# Patient Record
Sex: FEMALE | Race: WHITE | NOT HISPANIC OR LATINO | Employment: OTHER | URBAN - METROPOLITAN AREA
[De-identification: names, ages, dates, MRNs, and addresses within clinical notes are randomized per-mention and may not be internally consistent; named-entity substitution may affect disease eponyms.]

---

## 2023-02-23 LAB
ANION GAP IN SER/PLAS: 14 MMOL/L (ref 10–20)
APPEARANCE, URINE: ABNORMAL
BACTERIA, URINE: ABNORMAL /HPF
BASOPHILS (10*3/UL) IN BLOOD BY AUTOMATED COUNT: 0.03 X10E9/L (ref 0–0.1)
BASOPHILS/100 LEUKOCYTES IN BLOOD BY AUTOMATED COUNT: 0.3 % (ref 0–2)
BILIRUBIN, URINE: NEGATIVE
BLOOD, URINE: NEGATIVE
CALCIUM (MG/DL) IN SER/PLAS: 8.7 MG/DL (ref 8.6–10.3)
CARBON DIOXIDE, TOTAL (MMOL/L) IN SER/PLAS: 23 MMOL/L (ref 21–32)
CHLORIDE (MMOL/L) IN SER/PLAS: 105 MMOL/L (ref 98–107)
COLOR, URINE: ABNORMAL
CREATININE (MG/DL) IN SER/PLAS: 0.68 MG/DL (ref 0.5–1.05)
EOSINOPHILS (10*3/UL) IN BLOOD BY AUTOMATED COUNT: 0.29 X10E9/L (ref 0–0.4)
EOSINOPHILS/100 LEUKOCYTES IN BLOOD BY AUTOMATED COUNT: 2.6 % (ref 0–6)
ERYTHROCYTE DISTRIBUTION WIDTH (RATIO) BY AUTOMATED COUNT: 15.8 % (ref 11.5–14.5)
ERYTHROCYTE MEAN CORPUSCULAR HEMOGLOBIN CONCENTRATION (G/DL) BY AUTOMATED: 31.2 G/DL (ref 32–36)
ERYTHROCYTE MEAN CORPUSCULAR VOLUME (FL) BY AUTOMATED COUNT: 90 FL (ref 80–100)
ERYTHROCYTES (10*6/UL) IN BLOOD BY AUTOMATED COUNT: 4.13 X10E12/L (ref 4–5.2)
GFR FEMALE: 88 ML/MIN/1.73M2
GLUCOSE (MG/DL) IN SER/PLAS: 156 MG/DL (ref 74–99)
GLUCOSE, URINE: NEGATIVE MG/DL
HEMATOCRIT (%) IN BLOOD BY AUTOMATED COUNT: 37.2 % (ref 36–46)
HEMOGLOBIN (G/DL) IN BLOOD: 11.6 G/DL (ref 12–16)
IMMATURE GRANULOCYTES/100 LEUKOCYTES IN BLOOD BY AUTOMATED COUNT: 1 % (ref 0–0.9)
KETONES, URINE: ABNORMAL MG/DL
LEUKOCYTE ESTERASE, URINE: ABNORMAL
LEUKOCYTES (10*3/UL) IN BLOOD BY AUTOMATED COUNT: 11 X10E9/L (ref 4.4–11.3)
LYMPHOCYTES (10*3/UL) IN BLOOD BY AUTOMATED COUNT: 1.72 X10E9/L (ref 0.8–3)
LYMPHOCYTES/100 LEUKOCYTES IN BLOOD BY AUTOMATED COUNT: 15.7 % (ref 13–44)
MONOCYTES (10*3/UL) IN BLOOD BY AUTOMATED COUNT: 1.29 X10E9/L (ref 0.05–0.8)
MONOCYTES/100 LEUKOCYTES IN BLOOD BY AUTOMATED COUNT: 11.8 % (ref 2–10)
MUCUS, URINE: ABNORMAL /LPF
NEUTROPHILS (10*3/UL) IN BLOOD BY AUTOMATED COUNT: 7.51 X10E9/L (ref 1.6–5.5)
NEUTROPHILS/100 LEUKOCYTES IN BLOOD BY AUTOMATED COUNT: 68.6 % (ref 40–80)
NITRITE, URINE: NEGATIVE
NRBC (PER 100 WBCS) BY AUTOMATED COUNT: 0 /100 WBC (ref 0–0)
PH, URINE: 6 (ref 5–8)
PLATELETS (10*3/UL) IN BLOOD AUTOMATED COUNT: 377 X10E9/L (ref 150–450)
POTASSIUM (MMOL/L) IN SER/PLAS: 3.9 MMOL/L (ref 3.5–5.3)
PROTEIN, URINE: ABNORMAL MG/DL
RBC, URINE: ABNORMAL /HPF (ref 0–5)
SODIUM (MMOL/L) IN SER/PLAS: 138 MMOL/L (ref 136–145)
SPECIFIC GRAVITY, URINE: 1.02 (ref 1–1.03)
SQUAMOUS EPITHELIAL CELLS, URINE: 4 /HPF
UREA NITROGEN (MG/DL) IN SER/PLAS: 15 MG/DL (ref 6–23)
UROBILINOGEN, URINE: <2 MG/DL (ref 0–1.9)
WBC, URINE: 18 /HPF (ref 0–5)

## 2023-03-29 LAB — AMMONIA (UMOL/L) IN PLASMA: 44 UMOL/L

## 2023-05-17 LAB — AMMONIA (UMOL/L) IN PLASMA: 28 UMOL/L

## 2023-05-18 LAB
ALANINE AMINOTRANSFERASE (SGPT) (U/L) IN SER/PLAS: 133 U/L (ref 7–45)
ALBUMIN (G/DL) IN SER/PLAS: 3 G/DL (ref 3.4–5)
ALKALINE PHOSPHATASE (U/L) IN SER/PLAS: 366 U/L (ref 33–136)
AMMONIA (UMOL/L) IN PLASMA: 35 UMOL/L
ANION GAP IN SER/PLAS: 12 MMOL/L (ref 10–20)
APPEARANCE, URINE: ABNORMAL
ASPARTATE AMINOTRANSFERASE (SGOT) (U/L) IN SER/PLAS: 64 U/L (ref 9–39)
BACTERIA, URINE: ABNORMAL /HPF
BASOPHILS (10*3/UL) IN BLOOD BY AUTOMATED COUNT: 0.05 X10E9/L (ref 0–0.1)
BASOPHILS/100 LEUKOCYTES IN BLOOD BY AUTOMATED COUNT: 0.6 % (ref 0–2)
BILIRUBIN TOTAL (MG/DL) IN SER/PLAS: 0.9 MG/DL (ref 0–1.2)
BILIRUBIN, URINE: NEGATIVE
BLOOD, URINE: NEGATIVE
CALCIUM (MG/DL) IN SER/PLAS: 8.7 MG/DL (ref 8.6–10.3)
CARBON DIOXIDE, TOTAL (MMOL/L) IN SER/PLAS: 23 MMOL/L (ref 21–32)
CHLORIDE (MMOL/L) IN SER/PLAS: 107 MMOL/L (ref 98–107)
COLOR, URINE: ABNORMAL
CREATININE (MG/DL) IN SER/PLAS: 0.61 MG/DL (ref 0.5–1.05)
EOSINOPHILS (10*3/UL) IN BLOOD BY AUTOMATED COUNT: 0.15 X10E9/L (ref 0–0.4)
EOSINOPHILS/100 LEUKOCYTES IN BLOOD BY AUTOMATED COUNT: 1.8 % (ref 0–6)
ERYTHROCYTE DISTRIBUTION WIDTH (RATIO) BY AUTOMATED COUNT: 14.6 % (ref 11.5–14.5)
ERYTHROCYTE MEAN CORPUSCULAR HEMOGLOBIN CONCENTRATION (G/DL) BY AUTOMATED: 31.8 G/DL (ref 32–36)
ERYTHROCYTE MEAN CORPUSCULAR VOLUME (FL) BY AUTOMATED COUNT: 89 FL (ref 80–100)
ERYTHROCYTES (10*6/UL) IN BLOOD BY AUTOMATED COUNT: 3.96 X10E12/L (ref 4–5.2)
GFR FEMALE: 90 ML/MIN/1.73M2
GLUCOSE (MG/DL) IN SER/PLAS: 96 MG/DL (ref 74–99)
GLUCOSE, URINE: NEGATIVE MG/DL
HEMATOCRIT (%) IN BLOOD BY AUTOMATED COUNT: 35.2 % (ref 36–46)
HEMOGLOBIN (G/DL) IN BLOOD: 11.2 G/DL (ref 12–16)
IMMATURE GRANULOCYTES/100 LEUKOCYTES IN BLOOD BY AUTOMATED COUNT: 0.5 % (ref 0–0.9)
KETONES, URINE: NEGATIVE MG/DL
LEUKOCYTE ESTERASE, URINE: ABNORMAL
LEUKOCYTES (10*3/UL) IN BLOOD BY AUTOMATED COUNT: 8.3 X10E9/L (ref 4.4–11.3)
LYMPHOCYTES (10*3/UL) IN BLOOD BY AUTOMATED COUNT: 2.02 X10E9/L (ref 0.8–3)
LYMPHOCYTES/100 LEUKOCYTES IN BLOOD BY AUTOMATED COUNT: 24.4 % (ref 13–44)
MONOCYTES (10*3/UL) IN BLOOD BY AUTOMATED COUNT: 1.08 X10E9/L (ref 0.05–0.8)
MONOCYTES/100 LEUKOCYTES IN BLOOD BY AUTOMATED COUNT: 13 % (ref 2–10)
MUCUS, URINE: ABNORMAL /LPF
NEUTROPHILS (10*3/UL) IN BLOOD BY AUTOMATED COUNT: 4.94 X10E9/L (ref 1.6–5.5)
NEUTROPHILS/100 LEUKOCYTES IN BLOOD BY AUTOMATED COUNT: 59.7 % (ref 40–80)
NITRITE, URINE: NEGATIVE
NRBC (PER 100 WBCS) BY AUTOMATED COUNT: 0 /100 WBC (ref 0–0)
PH, URINE: 5 (ref 5–8)
PLATELETS (10*3/UL) IN BLOOD AUTOMATED COUNT: 328 X10E9/L (ref 150–450)
POTASSIUM (MMOL/L) IN SER/PLAS: 4 MMOL/L (ref 3.5–5.3)
PROTEIN TOTAL: 5.5 G/DL (ref 6.4–8.2)
PROTEIN, URINE: NEGATIVE MG/DL
RBC, URINE: 3 /HPF (ref 0–5)
SODIUM (MMOL/L) IN SER/PLAS: 138 MMOL/L (ref 136–145)
SPECIFIC GRAVITY, URINE: 1.02 (ref 1–1.03)
SQUAMOUS EPITHELIAL CELLS, URINE: 6 /HPF
UREA NITROGEN (MG/DL) IN SER/PLAS: 12 MG/DL (ref 6–23)
UROBILINOGEN, URINE: 4 MG/DL (ref 0–1.9)
WBC CLUMPS, URINE: ABNORMAL /HPF
WBC, URINE: 18 /HPF (ref 0–5)

## 2023-05-31 LAB — AMMONIA (UMOL/L) IN PLASMA: 27 UMOL/L

## 2023-09-22 ENCOUNTER — NURSING HOME VISIT (OUTPATIENT)
Dept: POST ACUTE CARE | Facility: EXTERNAL LOCATION | Age: 83
End: 2023-09-22
Payer: MEDICARE

## 2023-09-22 DIAGNOSIS — C44.319 BASAL CELL CARCINOMA OF SKIN OF OTHER PARTS OF FACE: Primary | ICD-10-CM

## 2023-09-22 DIAGNOSIS — I10 ESSENTIAL HYPERTENSION: ICD-10-CM

## 2023-09-22 DIAGNOSIS — K21.9 GERD WITHOUT ESOPHAGITIS: ICD-10-CM

## 2023-09-22 DIAGNOSIS — F03.90 DEMENTIA, UNSPECIFIED DEMENTIA SEVERITY, UNSPECIFIED DEMENTIA TYPE, UNSPECIFIED WHETHER BEHAVIORAL, PSYCHOTIC, OR MOOD DISTURBANCE OR ANXIETY (MULTI): ICD-10-CM

## 2023-09-22 DIAGNOSIS — K58.9 IRRITABLE BOWEL SYNDROME, UNSPECIFIED TYPE: ICD-10-CM

## 2023-09-22 DIAGNOSIS — I48.20 CHRONIC ATRIAL FIBRILLATION (MULTI): ICD-10-CM

## 2023-09-22 DIAGNOSIS — E78.2 MIXED HYPERLIPIDEMIA: ICD-10-CM

## 2023-09-22 DIAGNOSIS — N32.81 OVERACTIVE BLADDER: ICD-10-CM

## 2023-09-22 PROCEDURE — 99305 1ST NF CARE MODERATE MDM 35: CPT | Performed by: INTERNAL MEDICINE

## 2023-09-22 NOTE — LETTER
Patient: Lizette Oneill  : 1940    Encounter Date: 2023    HISTORY & PHYSICAL    Subjective  Chief complaint: Lizette Oneill is a 83 y.o. female who is a long term resident patient being seen and evaluated for multiple medical problems.  Patient presents for weakness.    HPI:  Patient is a 83 year old female with a past medical history of HLD, HTN, dementia, dysphagia, cognitive communication deficit, and a-fib. Patient is a long term care resident who is now under ' care.      Past Medical History:   Diagnosis Date   • Contusion of right hip, initial encounter 2019    Contusion of right hip, initial encounter   • Personal history of other diseases of the digestive system     History of hemorrhoids   • Personal history of other diseases of the musculoskeletal system and connective tissue     History of backache   • Personal history of other diseases of the musculoskeletal system and connective tissue     History of arthritis   • Personal history of other diseases of the respiratory system     History of bronchitis   • Personal history of other diseases of the respiratory system 2016    History of pharyngitis   • Personal history of other infectious and parasitic diseases     History of measles   • Personal history of other specified conditions     History of fatigue   • Personal history of other specified conditions 2019    History of chest pain   • Personal history of other specified conditions 2019    History of dizziness   • Personal history of urinary (tract) infections     History of bladder infections   • Strain of muscle and tendon of unspecified wall of thorax, initial encounter 10/14/2016    Thoracic myofascial strain       Past Surgical History:   Procedure Laterality Date   • HYSTERECTOMY  2015    Hysterectomy       No family history on file.    Social History     Socioeconomic History   • Marital status:      Spouse name: Not on file   • Number of  children: Not on file   • Years of education: Not on file   • Highest education level: Not on file   Occupational History   • Not on file   Tobacco Use   • Smoking status: Not on file   • Smokeless tobacco: Not on file   Substance and Sexual Activity   • Alcohol use: Not on file   • Drug use: Not on file   • Sexual activity: Not on file   Other Topics Concern   • Not on file   Social History Narrative   • Not on file     Social Determinants of Health     Financial Resource Strain: Not on file   Food Insecurity: Not on file   Transportation Needs: Not on file   Physical Activity: Not on file   Stress: Not on file   Social Connections: Not on file   Intimate Partner Violence: Not on file   Housing Stability: Not on file       Vital signs: VSS    Objective  Physical Exam  HENT:      Head: Normocephalic and atraumatic.      Nose: Nose normal.      Mouth/Throat:      Mouth: Mucous membranes are moist.      Pharynx: Oropharynx is clear.   Eyes:      Extraocular Movements: Extraocular movements intact.      Pupils: Pupils are equal, round, and reactive to light.   Cardiovascular:      Rate and Rhythm: Normal rate and regular rhythm.   Pulmonary:      Effort: No respiratory distress.      Breath sounds: Normal breath sounds. No wheezing, rhonchi or rales.   Abdominal:      General: Bowel sounds are normal. There is no distension.      Palpations: Abdomen is soft.      Tenderness: There is no abdominal tenderness. There is no guarding.   Musculoskeletal:      Right lower leg: No edema.      Left lower leg: No edema.   Skin:     General: Skin is warm and dry.   Neurological:      Mental Status: She is alert. Mental status is at baseline.         Assessment/Plan  Problem List Items Addressed This Visit       Basal cell carcinoma of skin of other parts of face - Primary    Essential hypertension    GERD without esophagitis    Hyperlipidemia    IBS (irritable bowel syndrome)    Overactive bladder    Chronic atrial fibrillation  (CMS/[facility])    Dementia (CMS/[facility])     Medications, treatments, and labs reviewed  Continue medications and treatments as listed in [facility]    Scribe Attestation  I, Meena Armas   attest that this documentation has been prepared under the direction and in the presence of Oswaldo Meyers DO.    Provider Attestation - Scribe documentation  All medical record entries made by the Scribe were at my direction and personally dictated by me. I have reviewed the chart and agree that the record accurately reflects my personal performance of the history, physical exam, discussion and plan.    Oswaldo Meyers DO          Electronically Signed By: Oswaldo Meyers DO   10/6/23  9:36 PM

## 2023-10-04 NOTE — PROGRESS NOTES
HISTORY & PHYSICAL    Subjective   Chief complaint: Lizette Oneill is a 83 y.o. female who is a long term resident patient being seen and evaluated for multiple medical problems.  Patient presents for weakness.    HPI:  Patient is a 83 year old female with a past medical history of HLD, HTN, dementia, dysphagia, cognitive communication deficit, and a-fib. Patient is a long term care resident who is now under ' care.      Past Medical History:   Diagnosis Date    Contusion of right hip, initial encounter 08/01/2019    Contusion of right hip, initial encounter    Personal history of other diseases of the digestive system     History of hemorrhoids    Personal history of other diseases of the musculoskeletal system and connective tissue     History of backache    Personal history of other diseases of the musculoskeletal system and connective tissue     History of arthritis    Personal history of other diseases of the respiratory system     History of bronchitis    Personal history of other diseases of the respiratory system 02/08/2016    History of pharyngitis    Personal history of other infectious and parasitic diseases     History of measles    Personal history of other specified conditions     History of fatigue    Personal history of other specified conditions 05/17/2019    History of chest pain    Personal history of other specified conditions 05/17/2019    History of dizziness    Personal history of urinary (tract) infections     History of bladder infections    Strain of muscle and tendon of unspecified wall of thorax, initial encounter 10/14/2016    Thoracic myofascial strain       Past Surgical History:   Procedure Laterality Date    HYSTERECTOMY  11/18/2015    Hysterectomy       No family history on file.    Social History     Socioeconomic History    Marital status:      Spouse name: Not on file    Number of children: Not on file    Years of education: Not on file    Highest education level:  Not on file   Occupational History    Not on file   Tobacco Use    Smoking status: Not on file    Smokeless tobacco: Not on file   Substance and Sexual Activity    Alcohol use: Not on file    Drug use: Not on file    Sexual activity: Not on file   Other Topics Concern    Not on file   Social History Narrative    Not on file     Social Determinants of Health     Financial Resource Strain: Not on file   Food Insecurity: Not on file   Transportation Needs: Not on file   Physical Activity: Not on file   Stress: Not on file   Social Connections: Not on file   Intimate Partner Violence: Not on file   Housing Stability: Not on file       Vital signs: VSS    Objective   Physical Exam  HENT:      Head: Normocephalic and atraumatic.      Nose: Nose normal.      Mouth/Throat:      Mouth: Mucous membranes are moist.      Pharynx: Oropharynx is clear.   Eyes:      Extraocular Movements: Extraocular movements intact.      Pupils: Pupils are equal, round, and reactive to light.   Cardiovascular:      Rate and Rhythm: Normal rate and regular rhythm.   Pulmonary:      Effort: No respiratory distress.      Breath sounds: Normal breath sounds. No wheezing, rhonchi or rales.   Abdominal:      General: Bowel sounds are normal. There is no distension.      Palpations: Abdomen is soft.      Tenderness: There is no abdominal tenderness. There is no guarding.   Musculoskeletal:      Right lower leg: No edema.      Left lower leg: No edema.   Skin:     General: Skin is warm and dry.   Neurological:      Mental Status: She is alert. Mental status is at baseline.         Assessment/Plan   Problem List Items Addressed This Visit       Basal cell carcinoma of skin of other parts of face - Primary    Essential hypertension    GERD without esophagitis    Hyperlipidemia    IBS (irritable bowel syndrome)    Overactive bladder    Chronic atrial fibrillation (CMS/HCC)    Dementia (CMS/HCC)     Medications, treatments, and labs reviewed  Continue  medications and treatments as listed in [facility]    Scribe Attestation  I, Meena Armas   attest that this documentation has been prepared under the direction and in the presence of Oswaldo Meyers DO.    Provider Attestation - Scribe documentation  All medical record entries made by the Scribe were at my direction and personally dictated by me. I have reviewed the chart and agree that the record accurately reflects my personal performance of the history, physical exam, discussion and plan.    Oswaldo Meyers DO

## 2023-10-05 ENCOUNTER — NURSING HOME VISIT (OUTPATIENT)
Dept: POST ACUTE CARE | Facility: EXTERNAL LOCATION | Age: 83
End: 2023-10-05
Payer: MEDICARE

## 2023-10-05 DIAGNOSIS — I48.20 CHRONIC ATRIAL FIBRILLATION (MULTI): Primary | ICD-10-CM

## 2023-10-05 DIAGNOSIS — I10 ESSENTIAL HYPERTENSION: ICD-10-CM

## 2023-10-05 DIAGNOSIS — F03.90 DEMENTIA, UNSPECIFIED DEMENTIA SEVERITY, UNSPECIFIED DEMENTIA TYPE, UNSPECIFIED WHETHER BEHAVIORAL, PSYCHOTIC, OR MOOD DISTURBANCE OR ANXIETY (MULTI): ICD-10-CM

## 2023-10-05 DIAGNOSIS — K21.9 GERD WITHOUT ESOPHAGITIS: ICD-10-CM

## 2023-10-05 PROCEDURE — 99309 SBSQ NF CARE MODERATE MDM 30: CPT

## 2023-10-05 NOTE — LETTER
Patient: Lizette Oneill  : 1940    Encounter Date: 10/05/2023    PROGRESS NOTE    Subjective  Chief complaint: Lizette Oneill is a 83 y.o. female who is a long term care patient being seen and evaluated for  general medical care monthly follow-up    HPI:  Patient seen and evaluated for general medical care monthly follow-up.  Patient resides in SNF for assistance with ADLs and medical care.  PMH includes HTN, HLD, anemia, dementia, SVT, falls, A-fib, IBS, GERD.   Patient at baseline mentation, calm, cooperative, pleasant.  Offers no complaints at time.  A-fib stable- no SOB, fatigue, dizziness.  No recent falls.  No concerns per nursing          Objective  Vital signs: 129/74-73-18    Physical Exam  Constitutional:       Appearance: Normal appearance.   HENT:      Head: Normocephalic.      Mouth/Throat:      Mouth: Mucous membranes are moist.   Eyes:      Extraocular Movements: Extraocular movements intact.   Cardiovascular:      Rate and Rhythm: Normal rate. Rhythm irregular.      Pulses: Normal pulses.      Heart sounds: Normal heart sounds.   Pulmonary:      Effort: Pulmonary effort is normal.      Breath sounds: Normal breath sounds.   Abdominal:      General: Bowel sounds are normal.      Palpations: Abdomen is soft.   Musculoskeletal:         General: Normal range of motion.      Cervical back: Normal range of motion and neck supple.   Skin:     General: Skin is warm and dry.      Capillary Refill: Capillary refill takes less than 2 seconds.   Neurological:      Mental Status: She is alert. Mental status is at baseline.   Psychiatric:         Mood and Affect: Mood normal.         Behavior: Behavior normal.         Assessment/Plan  Problem List Items Addressed This Visit       Essential hypertension      Stable   Metoprolol   monitor         GERD without esophagitis      Stable   no epigastric pain or acidic taste in mouth   Omeprazole   monitor         Chronic atrial fibrillation (CMS/HCC) - Primary       Stable   rate controlled   Metoprolol   no SOB, fatigue, dizziness, palpitations   monitor         Dementia (CMS/[facility])      Stable   baseline mentation   no outbursts or agitation per    ensure safe environment   monitor          Medications, treatments, and labs reviewed  Continue medications and treatments as listed in EMR    YOANA Huddleston      Electronically Signed By: YOANA Huddleston   10/26/23  8:53 PM

## 2023-10-06 PROBLEM — E78.5 HYPERLIPIDEMIA: Status: ACTIVE | Noted: 2023-10-06

## 2023-10-06 PROBLEM — I48.20 CHRONIC ATRIAL FIBRILLATION (MULTI): Status: ACTIVE | Noted: 2023-10-06

## 2023-10-06 PROBLEM — N32.81 OVERACTIVE BLADDER: Status: ACTIVE | Noted: 2023-10-06

## 2023-10-06 PROBLEM — K21.9 GERD WITHOUT ESOPHAGITIS: Status: ACTIVE | Noted: 2023-10-06

## 2023-10-06 PROBLEM — I10 ESSENTIAL HYPERTENSION: Status: ACTIVE | Noted: 2023-10-06

## 2023-10-06 PROBLEM — F03.90 DEMENTIA (MULTI): Status: ACTIVE | Noted: 2023-10-06

## 2023-10-06 PROBLEM — C44.319 BASAL CELL CARCINOMA OF SKIN OF OTHER PARTS OF FACE: Status: ACTIVE | Noted: 2021-05-25

## 2023-10-06 PROBLEM — K58.9 IBS (IRRITABLE BOWEL SYNDROME): Status: ACTIVE | Noted: 2023-10-06

## 2023-10-27 NOTE — PROGRESS NOTES
PROGRESS NOTE    Subjective   Chief complaint: Lizette Oneill is a 83 y.o. female who is a long term care patient being seen and evaluated for  general medical care monthly follow-up    HPI:  Patient seen and evaluated for general medical care monthly follow-up.  Patient resides in SNF for assistance with ADLs and medical care.  PMH includes HTN, HLD, anemia, dementia, SVT, falls, A-fib, IBS, GERD.   Patient at baseline mentation, calm, cooperative, pleasant.  Offers no complaints at time.  A-fib stable- no SOB, fatigue, dizziness.  No recent falls.  No concerns per nursing          Objective   Vital signs: 129/74-73-18    Physical Exam  Constitutional:       Appearance: Normal appearance.   HENT:      Head: Normocephalic.      Mouth/Throat:      Mouth: Mucous membranes are moist.   Eyes:      Extraocular Movements: Extraocular movements intact.   Cardiovascular:      Rate and Rhythm: Normal rate. Rhythm irregular.      Pulses: Normal pulses.      Heart sounds: Normal heart sounds.   Pulmonary:      Effort: Pulmonary effort is normal.      Breath sounds: Normal breath sounds.   Abdominal:      General: Bowel sounds are normal.      Palpations: Abdomen is soft.   Musculoskeletal:         General: Normal range of motion.      Cervical back: Normal range of motion and neck supple.   Skin:     General: Skin is warm and dry.      Capillary Refill: Capillary refill takes less than 2 seconds.   Neurological:      Mental Status: She is alert. Mental status is at baseline.   Psychiatric:         Mood and Affect: Mood normal.         Behavior: Behavior normal.         Assessment/Plan   Problem List Items Addressed This Visit       Essential hypertension      Stable   Metoprolol   monitor         GERD without esophagitis      Stable   no epigastric pain or acidic taste in mouth   Omeprazole   monitor         Chronic atrial fibrillation (CMS/HCC) - Primary      Stable   rate controlled   Metoprolol   no SOB, fatigue, dizziness,  palpitations   monitor         Dementia (CMS/[facility])      Stable   baseline mentation   no outbursts or agitation per    ensure safe environment   monitor          Medications, treatments, and labs reviewed  Continue medications and treatments as listed in EMR    Jessi Felton, APRN-CNP

## 2023-11-13 ENCOUNTER — NURSING HOME VISIT (OUTPATIENT)
Dept: POST ACUTE CARE | Facility: EXTERNAL LOCATION | Age: 83
End: 2023-11-13
Payer: MEDICARE

## 2023-11-13 DIAGNOSIS — K21.9 GERD WITHOUT ESOPHAGITIS: ICD-10-CM

## 2023-11-13 DIAGNOSIS — F03.90 DEMENTIA, UNSPECIFIED DEMENTIA SEVERITY, UNSPECIFIED DEMENTIA TYPE, UNSPECIFIED WHETHER BEHAVIORAL, PSYCHOTIC, OR MOOD DISTURBANCE OR ANXIETY (MULTI): ICD-10-CM

## 2023-11-13 DIAGNOSIS — I10 ESSENTIAL HYPERTENSION: ICD-10-CM

## 2023-11-13 DIAGNOSIS — I48.20 CHRONIC ATRIAL FIBRILLATION (MULTI): Primary | ICD-10-CM

## 2023-11-13 PROCEDURE — 99309 SBSQ NF CARE MODERATE MDM 30: CPT

## 2023-11-13 NOTE — LETTER
Patient: Lizette Oneill  : 1940    Encounter Date: 2023    PROGRESS NOTE    Subjective  Chief complaint: Lizette Oneill is a 83 y.o. female who is a long term care patient being seen and evaluated for  general medical care and monthly follow-up    HPI:  Patient seen and evaluated for general medical care monthly follow-up.  Patient at baseline mentation, calm, cooperative.  Offers no complaints at time.  A-fib stable- no SOB, palpitations, dizziness.  BP within goal.  No concerns per nursing.          Objective  Vital signs:  128/80-97.8-76-18    Physical Exam  Constitutional:       Appearance: Normal appearance.   HENT:      Head: Normocephalic.      Mouth/Throat:      Mouth: Mucous membranes are moist.   Eyes:      Extraocular Movements: Extraocular movements intact.   Cardiovascular:      Rate and Rhythm: Normal rate. Rhythm irregular.      Pulses: Normal pulses.      Heart sounds: Normal heart sounds.   Pulmonary:      Effort: Pulmonary effort is normal.      Breath sounds: Normal breath sounds.   Abdominal:      General: Bowel sounds are normal.      Palpations: Abdomen is soft.   Musculoskeletal:         General: Normal range of motion.      Cervical back: Normal range of motion and neck supple.   Skin:     General: Skin is warm and dry.      Capillary Refill: Capillary refill takes less than 2 seconds.   Neurological:      Mental Status: She is alert. Mental status is at baseline.   Psychiatric:         Mood and Affect: Mood normal.         Behavior: Behavior normal.         Assessment/Plan  Problem List Items Addressed This Visit       Essential hypertension      Stable   Metoprolol   monitor         GERD without esophagitis      Stable   no epigastric pain or acidic taste in mouth   Omeprazole   monitor         Chronic atrial fibrillation (CMS/HCC) - Primary      Stable   rate controlled   Metoprolol   no SOB, fatigue, dizziness, palpitations   monitor         Dementia (CMS/HCC)      Stable    baseline mentation   no outbursts or agitation per    ensure safe environment   monitor          Medications, treatments, and labs reviewed  Continue medications and treatments as listed in EMR    YOANA Huddleston      Electronically Signed By: YOANA Huddleston   11/30/23 12:58 AM

## 2023-11-30 NOTE — PROGRESS NOTES
PROGRESS NOTE    Subjective   Chief complaint: Lizette Oneill is a 83 y.o. female who is a long term care patient being seen and evaluated for  general medical care and monthly follow-up    HPI:  Patient seen and evaluated for general medical care monthly follow-up.  Patient at baseline mentation, calm, cooperative.  Offers no complaints at time.  A-fib stable- no SOB, palpitations, dizziness.  BP within goal.  No concerns per nursing.          Objective   Vital signs:  128/80-97.8-76-18    Physical Exam  Constitutional:       Appearance: Normal appearance.   HENT:      Head: Normocephalic.      Mouth/Throat:      Mouth: Mucous membranes are moist.   Eyes:      Extraocular Movements: Extraocular movements intact.   Cardiovascular:      Rate and Rhythm: Normal rate. Rhythm irregular.      Pulses: Normal pulses.      Heart sounds: Normal heart sounds.   Pulmonary:      Effort: Pulmonary effort is normal.      Breath sounds: Normal breath sounds.   Abdominal:      General: Bowel sounds are normal.      Palpations: Abdomen is soft.   Musculoskeletal:         General: Normal range of motion.      Cervical back: Normal range of motion and neck supple.   Skin:     General: Skin is warm and dry.      Capillary Refill: Capillary refill takes less than 2 seconds.   Neurological:      Mental Status: She is alert. Mental status is at baseline.   Psychiatric:         Mood and Affect: Mood normal.         Behavior: Behavior normal.         Assessment/Plan   Problem List Items Addressed This Visit       Essential hypertension      Stable   Metoprolol   monitor         GERD without esophagitis      Stable   no epigastric pain or acidic taste in mouth   Omeprazole   monitor         Chronic atrial fibrillation (CMS/HCC) - Primary      Stable   rate controlled   Metoprolol   no SOB, fatigue, dizziness, palpitations   monitor         Dementia (CMS/HCC)      Stable   baseline mentation   no outbursts or agitation per    ensure safe  environment   monitor          Medications, treatments, and labs reviewed  Continue medications and treatments as listed in EMR    Jessi Felton, APRN-CNP

## 2023-11-30 NOTE — ASSESSMENT & PLAN NOTE
Stable   Metoprolol   monitor   Patient does not have any ACP documents/Medical Power of . LSW notes hospital will follow [place]'s Next of Kin hierarchy in the following descending order for priority:    Guardian  Spouse  [de-identified] of adult Children  Parents  [de-identified] of adult Siblings  Nearest Relative not described above    Per [place] Next of Kin hierarchy: Patients' 3 children will equally be [address]

## 2023-12-21 ENCOUNTER — NURSING HOME VISIT (OUTPATIENT)
Dept: POST ACUTE CARE | Facility: EXTERNAL LOCATION | Age: 83
End: 2023-12-21
Payer: MEDICARE

## 2023-12-21 DIAGNOSIS — I10 ESSENTIAL HYPERTENSION: ICD-10-CM

## 2023-12-21 DIAGNOSIS — I48.20 CHRONIC ATRIAL FIBRILLATION (MULTI): Primary | ICD-10-CM

## 2023-12-21 DIAGNOSIS — F03.90 DEMENTIA, UNSPECIFIED DEMENTIA SEVERITY, UNSPECIFIED DEMENTIA TYPE, UNSPECIFIED WHETHER BEHAVIORAL, PSYCHOTIC, OR MOOD DISTURBANCE OR ANXIETY (MULTI): ICD-10-CM

## 2023-12-21 DIAGNOSIS — K21.9 GERD WITHOUT ESOPHAGITIS: ICD-10-CM

## 2023-12-21 PROCEDURE — 99309 SBSQ NF CARE MODERATE MDM 30: CPT

## 2023-12-21 NOTE — LETTER
Patient: Lizette Oneill  : 1940    Encounter Date: 2023    PROGRESS NOTE    Subjective  Chief complaint: Lizette Oneill is a 83 y.o. female who is a long term care patient being seen and evaluated for  general medical care and monthly follow-up    HPI:  Patient seen and evaluated for general medical care monthly follow-up.  Patient at baseline mentation, calm, cooperative, pleasant.  Offers no complaints at time.  A-fib stable- no SOB, dizziness, palpitations.  Appetite stable.  Sleeping well.  Continues to work with therapy for safe mobility.  No concerns per nursing          Objective  Vital signs:  122/76-97.4-72-18-97%    Physical Exam  Constitutional:       Appearance: Normal appearance.   HENT:      Head: Normocephalic.      Mouth/Throat:      Mouth: Mucous membranes are moist.   Eyes:      Extraocular Movements: Extraocular movements intact.   Cardiovascular:      Rate and Rhythm: Normal rate. Rhythm irregular.      Pulses: Normal pulses.      Heart sounds: Normal heart sounds.   Pulmonary:      Effort: Pulmonary effort is normal.      Breath sounds: Normal breath sounds.   Abdominal:      General: Bowel sounds are normal.      Palpations: Abdomen is soft.   Musculoskeletal:         General: Normal range of motion.      Cervical back: Normal range of motion and neck supple.      Comments:  generalized weakness   Skin:     General: Skin is warm and dry.      Capillary Refill: Capillary refill takes less than 2 seconds.   Neurological:      Mental Status: She is alert. Mental status is at baseline.   Psychiatric:         Mood and Affect: Mood normal.         Behavior: Behavior normal.         Assessment/Plan  Problem List Items Addressed This Visit       Essential hypertension      Stable   Metoprolol   monitor         GERD without esophagitis      Stable   no epigastric pain or acidic taste in mouth   Omeprazole   monitor         Chronic atrial fibrillation (CMS/HCC) - Primary      Stable   rate  controlled   Metoprolol   no SOB, fatigue, dizziness, palpitations   monitor         Dementia (CMS/[facility])      Stable   baseline mentation   no outbursts or agitation per    ensure safe environment   monitor          Medications, treatments, and labs reviewed  Continue medications and treatments as listed in EMR    YOANA Huddleston      Electronically Signed By: YOANA Huddleston   12/31/23  5:16 PM

## 2023-12-31 NOTE — PROGRESS NOTES
PROGRESS NOTE    Subjective   Chief complaint: Lizette Oneill is a 83 y.o. female who is a long term care patient being seen and evaluated for  general medical care and monthly follow-up    HPI:  Patient seen and evaluated for general medical care monthly follow-up.  Patient at baseline mentation, calm, cooperative, pleasant.  Offers no complaints at time.  A-fib stable- no SOB, dizziness, palpitations.  Appetite stable.  Sleeping well.  Continues to work with therapy for safe mobility.  No concerns per nursing          Objective   Vital signs:  122/76-97.4-72-18-97%    Physical Exam  Constitutional:       Appearance: Normal appearance.   HENT:      Head: Normocephalic.      Mouth/Throat:      Mouth: Mucous membranes are moist.   Eyes:      Extraocular Movements: Extraocular movements intact.   Cardiovascular:      Rate and Rhythm: Normal rate. Rhythm irregular.      Pulses: Normal pulses.      Heart sounds: Normal heart sounds.   Pulmonary:      Effort: Pulmonary effort is normal.      Breath sounds: Normal breath sounds.   Abdominal:      General: Bowel sounds are normal.      Palpations: Abdomen is soft.   Musculoskeletal:         General: Normal range of motion.      Cervical back: Normal range of motion and neck supple.      Comments:  generalized weakness   Skin:     General: Skin is warm and dry.      Capillary Refill: Capillary refill takes less than 2 seconds.   Neurological:      Mental Status: She is alert. Mental status is at baseline.   Psychiatric:         Mood and Affect: Mood normal.         Behavior: Behavior normal.         Assessment/Plan   Problem List Items Addressed This Visit       Essential hypertension      Stable   Metoprolol   monitor         GERD without esophagitis      Stable   no epigastric pain or acidic taste in mouth   Omeprazole   monitor         Chronic atrial fibrillation (CMS/HCC) - Primary      Stable   rate controlled   Metoprolol   no SOB, fatigue, dizziness, palpitations    monitor         Dementia (CMS/[facility])      Stable   baseline mentation   no outbursts or agitation per    ensure safe environment   monitor          Medications, treatments, and labs reviewed  Continue medications and treatments as listed in EMR    Jessi Felton, APRN-CNP

## 2024-01-09 ENCOUNTER — NURSING HOME VISIT (OUTPATIENT)
Dept: POST ACUTE CARE | Facility: EXTERNAL LOCATION | Age: 84
End: 2024-01-09
Payer: MEDICARE

## 2024-01-09 DIAGNOSIS — I10 ESSENTIAL HYPERTENSION: ICD-10-CM

## 2024-01-09 DIAGNOSIS — K21.9 GERD WITHOUT ESOPHAGITIS: ICD-10-CM

## 2024-01-09 DIAGNOSIS — F03.90 DEMENTIA, UNSPECIFIED DEMENTIA SEVERITY, UNSPECIFIED DEMENTIA TYPE, UNSPECIFIED WHETHER BEHAVIORAL, PSYCHOTIC, OR MOOD DISTURBANCE OR ANXIETY (MULTI): Primary | ICD-10-CM

## 2024-01-09 PROCEDURE — 99309 SBSQ NF CARE MODERATE MDM 30: CPT

## 2024-01-09 NOTE — LETTER
Patient: Lizette Oneill  : 1940    Encounter Date: 2024    PROGRESS NOTE    Subjective  Chief complaint: Lizette Oneill is a 83 y.o. female who is a long term care patient being seen and evaluated for general medical care and monthly follow-up    HPI:  Patient seen and evaluated for general medical care and monthly follow-up.  Patient at baseline mentation, confused, pleasant.  Offers no complaints at time.  No outbursts or agitation noted per nursing.  Patient compliant with medications.  No changes in appetite or sleeping pattern.  Engages with other residents in activities and at meals.  No concerns per nursing          Objective  Vital signs:  132/88-98.1-87-20-98%    Physical Exam  Constitutional:       Appearance: Normal appearance.   HENT:      Head: Normocephalic.      Mouth/Throat:      Mouth: Mucous membranes are moist.   Eyes:      Extraocular Movements: Extraocular movements intact.   Cardiovascular:      Rate and Rhythm: Normal rate and regular rhythm.      Pulses: Normal pulses.      Heart sounds: Normal heart sounds.   Pulmonary:      Effort: Pulmonary effort is normal.      Breath sounds: Normal breath sounds.   Abdominal:      General: Bowel sounds are normal.      Palpations: Abdomen is soft.   Musculoskeletal:         General: Normal range of motion.      Cervical back: Normal range of motion and neck supple.      Comments:  generalized weakness   Skin:     General: Skin is warm and dry.      Capillary Refill: Capillary refill takes less than 2 seconds.   Neurological:      Mental Status: She is alert. Mental status is at baseline. She is disoriented.   Psychiatric:         Mood and Affect: Mood normal.         Behavior: Behavior normal.         Assessment/Plan  Problem List Items Addressed This Visit       Essential hypertension      Stable   Metoprolol   monitor         GERD without esophagitis      Stable   no epigastric pain or acidic taste in mouth   Omeprazole   monitor          Dementia (CMS/HCC) - Primary      Stable   baseline mentation   no outbursts or agitation per nursing   ensure safe environment   monitor          Medications, treatments, and labs reviewed  Continue medications and treatments as listed in EMR    YOANA Huddleston      Electronically Signed By: YOANA Huddleston   1/27/24  1:41 AM

## 2024-01-27 PROBLEM — F32.A DEPRESSION: Status: ACTIVE | Noted: 2024-01-27

## 2024-01-27 NOTE — ASSESSMENT & PLAN NOTE
Stable   baseline mentation   no outbursts or agitation per nursing   ensure safe environment   monitor

## 2024-01-27 NOTE — PROGRESS NOTES
PROGRESS NOTE    Subjective   Chief complaint: Lizette Oneill is a 83 y.o. female who is a long term care patient being seen and evaluated for general medical care and monthly follow-up    HPI:  Patient seen and evaluated for general medical care and monthly follow-up.  Patient at baseline mentation, confused, pleasant.  Offers no complaints at time.  No outbursts or agitation noted per nursing.  Patient compliant with medications.  No changes in appetite or sleeping pattern.  Engages with other residents in activities and at meals.  No concerns per nursing          Objective   Vital signs:  132/88-98.1-87-20-98%    Physical Exam  Constitutional:       Appearance: Normal appearance.   HENT:      Head: Normocephalic.      Mouth/Throat:      Mouth: Mucous membranes are moist.   Eyes:      Extraocular Movements: Extraocular movements intact.   Cardiovascular:      Rate and Rhythm: Normal rate and regular rhythm.      Pulses: Normal pulses.      Heart sounds: Normal heart sounds.   Pulmonary:      Effort: Pulmonary effort is normal.      Breath sounds: Normal breath sounds.   Abdominal:      General: Bowel sounds are normal.      Palpations: Abdomen is soft.   Musculoskeletal:         General: Normal range of motion.      Cervical back: Normal range of motion and neck supple.      Comments:  generalized weakness   Skin:     General: Skin is warm and dry.      Capillary Refill: Capillary refill takes less than 2 seconds.   Neurological:      Mental Status: She is alert. Mental status is at baseline. She is disoriented.   Psychiatric:         Mood and Affect: Mood normal.         Behavior: Behavior normal.         Assessment/Plan   Problem List Items Addressed This Visit       Essential hypertension      Stable   Metoprolol   monitor         GERD without esophagitis      Stable   no epigastric pain or acidic taste in mouth   Omeprazole   monitor         Dementia (CMS/HCC) - Primary      Stable   baseline mentation   no  outbursts or agitation per nursing   ensure safe environment   monitor          Medications, treatments, and labs reviewed  Continue medications and treatments as listed in EMR    Jessi Felton, APRN-CNP

## 2024-02-08 ENCOUNTER — NURSING HOME VISIT (OUTPATIENT)
Dept: POST ACUTE CARE | Facility: EXTERNAL LOCATION | Age: 84
End: 2024-02-08
Payer: MEDICARE

## 2024-02-08 DIAGNOSIS — I48.20 CHRONIC ATRIAL FIBRILLATION (MULTI): Primary | ICD-10-CM

## 2024-02-08 DIAGNOSIS — I10 ESSENTIAL HYPERTENSION: ICD-10-CM

## 2024-02-08 DIAGNOSIS — K58.9 IRRITABLE BOWEL SYNDROME, UNSPECIFIED TYPE: ICD-10-CM

## 2024-02-08 DIAGNOSIS — F03.90 DEMENTIA, UNSPECIFIED DEMENTIA SEVERITY, UNSPECIFIED DEMENTIA TYPE, UNSPECIFIED WHETHER BEHAVIORAL, PSYCHOTIC, OR MOOD DISTURBANCE OR ANXIETY (MULTI): ICD-10-CM

## 2024-02-08 DIAGNOSIS — K21.9 GERD WITHOUT ESOPHAGITIS: ICD-10-CM

## 2024-02-08 PROCEDURE — 99309 SBSQ NF CARE MODERATE MDM 30: CPT

## 2024-02-08 NOTE — LETTER
Patient: Lizette Oneill  : 1940    Encounter Date: 2024    PROGRESS NOTE    Subjective  Chief complaint: Lizette Oneill is a 83 y.o. female who is a long term care patient being seen and evaluated for  general medical care and monthly follow-up    HPI:  Patient seen and evaluated for general medical care monthly follow-up.  Patient at baseline mentation, calm, cooperative, pleasant.  Offers no complaints at time.  Continues to work with therapy for strengthening and mobility.  BP at goal.  A-fib stable- no SOB, dizziness, palpitations. Appetite good.  No complaints of abdominal pain, constipation, diarrhea.  No concerns per nursing          Objective  Vital signs:  132/88-98.1-87-20-98%    Physical Exam  Constitutional:       Appearance: Normal appearance.   HENT:      Head: Normocephalic.      Mouth/Throat:      Mouth: Mucous membranes are moist.   Eyes:      Extraocular Movements: Extraocular movements intact.   Cardiovascular:      Rate and Rhythm: Normal rate. Rhythm irregular.      Pulses: Normal pulses.      Heart sounds: Normal heart sounds.   Pulmonary:      Effort: Pulmonary effort is normal.      Breath sounds: Normal breath sounds.   Abdominal:      General: Bowel sounds are normal.      Palpations: Abdomen is soft.   Musculoskeletal:         General: Normal range of motion.      Cervical back: Normal range of motion and neck supple.      Comments:  generalized weakness   Skin:     General: Skin is warm and dry.      Capillary Refill: Capillary refill takes less than 2 seconds.   Neurological:      Mental Status: She is alert. Mental status is at baseline.   Psychiatric:         Mood and Affect: Mood normal.         Behavior: Behavior normal.         Assessment/Plan  Problem List Items Addressed This Visit       Essential hypertension      Stable   Metoprolol   monitor         GERD without esophagitis      Stable   no epigastric pain or acidic taste in mouth   Omeprazole   monitor         IBS  (irritable bowel syndrome)      Stable   No abdominal pain, N/V/D, constipation   monitor         Chronic atrial fibrillation (CMS/HCC) - Primary      Stable   rate controlled   Metoprolol   no SOB, fatigue, dizziness, palpitations   monitor         Dementia (CMS/HCC)      Stable   baseline mentation   no outbursts or agitation per nursing   ensure safe environment   monitor          Medications, treatments, and labs reviewed  Continue medications and treatments as listed in EMR    YOANA Huddleston      Electronically Signed By: YOANA Huddleston   2/22/24  4:49 PM

## 2024-02-22 NOTE — PROGRESS NOTES
PROGRESS NOTE    Subjective   Chief complaint: Lizette Oneill is a 83 y.o. female who is a long term care patient being seen and evaluated for  general medical care and monthly follow-up    HPI:  Patient seen and evaluated for general medical care monthly follow-up.  Patient at baseline mentation, calm, cooperative, pleasant.  Offers no complaints at time.  Continues to work with therapy for strengthening and mobility.  BP at goal.  A-fib stable- no SOB, dizziness, palpitations. Appetite good.  No complaints of abdominal pain, constipation, diarrhea.  No concerns per nursing          Objective   Vital signs:  132/88-98.1-87-20-98%    Physical Exam  Constitutional:       Appearance: Normal appearance.   HENT:      Head: Normocephalic.      Mouth/Throat:      Mouth: Mucous membranes are moist.   Eyes:      Extraocular Movements: Extraocular movements intact.   Cardiovascular:      Rate and Rhythm: Normal rate. Rhythm irregular.      Pulses: Normal pulses.      Heart sounds: Normal heart sounds.   Pulmonary:      Effort: Pulmonary effort is normal.      Breath sounds: Normal breath sounds.   Abdominal:      General: Bowel sounds are normal.      Palpations: Abdomen is soft.   Musculoskeletal:         General: Normal range of motion.      Cervical back: Normal range of motion and neck supple.      Comments:  generalized weakness   Skin:     General: Skin is warm and dry.      Capillary Refill: Capillary refill takes less than 2 seconds.   Neurological:      Mental Status: She is alert. Mental status is at baseline.   Psychiatric:         Mood and Affect: Mood normal.         Behavior: Behavior normal.         Assessment/Plan   Problem List Items Addressed This Visit       Essential hypertension      Stable   Metoprolol   monitor         GERD without esophagitis      Stable   no epigastric pain or acidic taste in mouth   Omeprazole   monitor         IBS (irritable bowel syndrome)      Stable   No abdominal pain, N/V/D,  constipation   monitor         Chronic atrial fibrillation (CMS/[facility]) - Primary      Stable   rate controlled   Metoprolol   no SOB, fatigue, dizziness, palpitations   monitor         Dementia (CMS/[facility])      Stable   baseline mentation   no outbursts or agitation per nursing   ensure safe environment   monitor          Medications, treatments, and labs reviewed  Continue medications and treatments as listed in EMR    Jessi Felton, APRN-CNP

## 2024-03-07 ENCOUNTER — NURSING HOME VISIT (OUTPATIENT)
Dept: POST ACUTE CARE | Facility: EXTERNAL LOCATION | Age: 84
End: 2024-03-07
Payer: MEDICARE

## 2024-03-07 DIAGNOSIS — F03.90 DEMENTIA, UNSPECIFIED DEMENTIA SEVERITY, UNSPECIFIED DEMENTIA TYPE, UNSPECIFIED WHETHER BEHAVIORAL, PSYCHOTIC, OR MOOD DISTURBANCE OR ANXIETY (MULTI): ICD-10-CM

## 2024-03-07 DIAGNOSIS — I10 ESSENTIAL HYPERTENSION: ICD-10-CM

## 2024-03-07 DIAGNOSIS — I48.20 CHRONIC ATRIAL FIBRILLATION (MULTI): Primary | ICD-10-CM

## 2024-03-07 PROCEDURE — 99309 SBSQ NF CARE MODERATE MDM 30: CPT

## 2024-03-07 NOTE — LETTER
Patient: Lizette Oneill  : 1940    Encounter Date: 2024    PROGRESS NOTE    Subjective  Chief complaint: Lizette Oneill is a 83 y.o. female who is a long term care patient being seen and evaluated for  general medical care and monthly follow-up    HPI:  Patient seen and evaluated for general medical care monthly follow-up.  Patient at baseline mentation, pleasant, cooperative.  Offers no complaints at time.  Patient has a mole on her neck.  History of skin cancer.  Pending Derm appointment. BP within goal.  Afib stable- no sob, dizziness, palpitations. No F/C/N/V/D SOB chest pain. No concerns per nursing.          Objective  Vital signs: 127/79-84-18-98%    Physical Exam  Constitutional:       Appearance: Normal appearance.   HENT:      Head: Normocephalic.      Mouth/Throat:      Mouth: Mucous membranes are moist.   Eyes:      Extraocular Movements: Extraocular movements intact.   Cardiovascular:      Rate and Rhythm: Normal rate. Rhythm irregular.      Pulses: Normal pulses.      Heart sounds: Normal heart sounds.   Pulmonary:      Effort: Pulmonary effort is normal.      Breath sounds: Normal breath sounds.   Abdominal:      General: Bowel sounds are normal.      Palpations: Abdomen is soft.   Musculoskeletal:         General: Normal range of motion.      Cervical back: Normal range of motion and neck supple.      Comments: General weakness   Skin:     General: Skin is warm and dry.      Capillary Refill: Capillary refill takes less than 2 seconds.      Comments: Mole on neck   Neurological:      Mental Status: She is alert. Mental status is at baseline.   Psychiatric:         Mood and Affect: Mood normal.         Behavior: Behavior normal.         Assessment/Plan  Problem List Items Addressed This Visit       Essential hypertension      Stable   Metoprolol   monitor         Chronic atrial fibrillation (CMS/HCC) - Primary      Stable   rate controlled   Metoprolol   no SOB, fatigue, dizziness,  palpitations   monitor         Dementia (CMS/[facility])      Stable   baseline mentation   no outbursts or agitation per nursing   ensure safe environment   monitor          Medications, treatments, and labs reviewed  Continue medications and treatments as listed in EMR    YOANA Huddleston      Electronically Signed By: YOANA Huddleston   3/26/24  5:13 PM

## 2024-03-26 NOTE — PROGRESS NOTES
PROGRESS NOTE    Subjective   Chief complaint: Lizette Oneill is a 83 y.o. female who is a long term care patient being seen and evaluated for  general medical care and monthly follow-up    HPI:  Patient seen and evaluated for general medical care monthly follow-up.  Patient at baseline mentation, pleasant, cooperative.  Offers no complaints at time.  Patient has a mole on her neck.  History of skin cancer.  Pending Derm appointment. BP within goal.  Afib stable- no sob, dizziness, palpitations. No F/C/N/V/D SOB chest pain. No concerns per nursing.          Objective   Vital signs: 127/79-84-18-98%    Physical Exam  Constitutional:       Appearance: Normal appearance.   HENT:      Head: Normocephalic.      Mouth/Throat:      Mouth: Mucous membranes are moist.   Eyes:      Extraocular Movements: Extraocular movements intact.   Cardiovascular:      Rate and Rhythm: Normal rate. Rhythm irregular.      Pulses: Normal pulses.      Heart sounds: Normal heart sounds.   Pulmonary:      Effort: Pulmonary effort is normal.      Breath sounds: Normal breath sounds.   Abdominal:      General: Bowel sounds are normal.      Palpations: Abdomen is soft.   Musculoskeletal:         General: Normal range of motion.      Cervical back: Normal range of motion and neck supple.      Comments: General weakness   Skin:     General: Skin is warm and dry.      Capillary Refill: Capillary refill takes less than 2 seconds.      Comments: Mole on neck   Neurological:      Mental Status: She is alert. Mental status is at baseline.   Psychiatric:         Mood and Affect: Mood normal.         Behavior: Behavior normal.         Assessment/Plan   Problem List Items Addressed This Visit       Essential hypertension      Stable   Metoprolol   monitor         Chronic atrial fibrillation (CMS/HCC) - Primary      Stable   rate controlled   Metoprolol   no SOB, fatigue, dizziness, palpitations   monitor         Dementia (CMS/HCC)      Stable   baseline  mentation   no outbursts or agitation per nursing   ensure safe environment   monitor          Medications, treatments, and labs reviewed  Continue medications and treatments as listed in EMR    Jessi Felton, APRN-CNP

## 2024-04-03 ENCOUNTER — NURSING HOME VISIT (OUTPATIENT)
Dept: POST ACUTE CARE | Facility: EXTERNAL LOCATION | Age: 84
End: 2024-04-03
Payer: MEDICARE

## 2024-04-03 DIAGNOSIS — F03.90 DEMENTIA, UNSPECIFIED DEMENTIA SEVERITY, UNSPECIFIED DEMENTIA TYPE, UNSPECIFIED WHETHER BEHAVIORAL, PSYCHOTIC, OR MOOD DISTURBANCE OR ANXIETY (MULTI): ICD-10-CM

## 2024-04-03 DIAGNOSIS — N32.81 OVERACTIVE BLADDER: ICD-10-CM

## 2024-04-03 DIAGNOSIS — I48.20 CHRONIC ATRIAL FIBRILLATION (MULTI): Primary | ICD-10-CM

## 2024-04-03 DIAGNOSIS — I10 ESSENTIAL HYPERTENSION: ICD-10-CM

## 2024-04-03 DIAGNOSIS — K21.9 GERD WITHOUT ESOPHAGITIS: ICD-10-CM

## 2024-04-03 PROCEDURE — 99309 SBSQ NF CARE MODERATE MDM 30: CPT

## 2024-04-03 NOTE — LETTER
Patient: Lizette Oneill  : 1940    Encounter Date: 2024    PROGRESS NOTE    Subjective  Chief complaint: Lizette Oneill is a 83 y.o. female who is a long term care patient being seen and evaluated for general medical care and monthly follow-up    HPI:  Patient seen and evaluated for general medical care and monthly follow-up.  Patient at baseline mentation, calm, cooperative, pleasant.  Offers no complaints at time.  Continues to work with therapy for strengthening and mobility.   Ambulated 40'-50' with 2 WW. BP at goal.  A-fib stable- no SOB, dizziness, palpitations.  .  Engages with other residents in activities and meals. Appetite good.  No complaints of abdominal pain, constipation, diarrhea.  No concerns per nursing          Objective  Vital signs:   132/70-98.1-82-20-97%    Physical Exam  Constitutional:       Appearance: Normal appearance.   HENT:      Head: Normocephalic.      Mouth/Throat:      Mouth: Mucous membranes are moist.   Eyes:      Extraocular Movements: Extraocular movements intact.   Cardiovascular:      Rate and Rhythm: Normal rate. Rhythm irregular.      Pulses: Normal pulses.      Heart sounds: Normal heart sounds.   Pulmonary:      Effort: Pulmonary effort is normal.      Breath sounds: Normal breath sounds.   Abdominal:      General: Bowel sounds are normal.      Palpations: Abdomen is soft.   Musculoskeletal:         General: Normal range of motion.      Cervical back: Normal range of motion and neck supple.      Comments: .  Generalized weakness   Skin:     General: Skin is warm and dry.      Capillary Refill: Capillary refill takes less than 2 seconds.   Neurological:      Mental Status: She is alert. Mental status is at baseline.   Psychiatric:         Mood and Affect: Mood normal.         Behavior: Behavior normal.         Assessment/Plan  Problem List Items Addressed This Visit       Essential hypertension      Stable   Metoprolol   monitor         GERD without esophagitis       Stable   no epigastric pain or acidic taste in mouth   Omeprazole   monitor         Overactive bladder      No urinary complaints   Monitor         Chronic atrial fibrillation (Multi) - Primary      Stable   rate controlled   Metoprolol   no SOB, fatigue, dizziness, palpitations   monitor         Dementia (Multi)      Stable   baseline mentation   no outbursts or agitation per nursing   ensure safe environment   monitor          Medications, treatments, and labs reviewed  Continue medications and treatments as listed in EMR    YOANA Huddleston      Electronically Signed By: YOANA Huddleston   4/30/24  3:23 PM

## 2024-04-30 NOTE — PROGRESS NOTES
PROGRESS NOTE    Subjective   Chief complaint: Lizette Oneill is a 83 y.o. female who is a long term care patient being seen and evaluated for general medical care and monthly follow-up    HPI:  Patient seen and evaluated for general medical care and monthly follow-up.  Patient at baseline mentation, calm, cooperative, pleasant.  Offers no complaints at time.  Continues to work with therapy for strengthening and mobility.   Ambulated 40'-50' with 2 WW. BP at goal.  A-fib stable- no SOB, dizziness, palpitations.  .  Engages with other residents in activities and meals. Appetite good.  No complaints of abdominal pain, constipation, diarrhea.  No concerns per nursing          Objective   Vital signs:   132/70-98.1-82-20-97%    Physical Exam  Constitutional:       Appearance: Normal appearance.   HENT:      Head: Normocephalic.      Mouth/Throat:      Mouth: Mucous membranes are moist.   Eyes:      Extraocular Movements: Extraocular movements intact.   Cardiovascular:      Rate and Rhythm: Normal rate. Rhythm irregular.      Pulses: Normal pulses.      Heart sounds: Normal heart sounds.   Pulmonary:      Effort: Pulmonary effort is normal.      Breath sounds: Normal breath sounds.   Abdominal:      General: Bowel sounds are normal.      Palpations: Abdomen is soft.   Musculoskeletal:         General: Normal range of motion.      Cervical back: Normal range of motion and neck supple.      Comments: .  Generalized weakness   Skin:     General: Skin is warm and dry.      Capillary Refill: Capillary refill takes less than 2 seconds.   Neurological:      Mental Status: She is alert. Mental status is at baseline.   Psychiatric:         Mood and Affect: Mood normal.         Behavior: Behavior normal.         Assessment/Plan   Problem List Items Addressed This Visit       Essential hypertension      Stable   Metoprolol   monitor         GERD without esophagitis      Stable   no epigastric pain or acidic taste in mouth    Omeprazole   monitor         Overactive bladder      No urinary complaints   Monitor         Chronic atrial fibrillation (Multi) - Primary      Stable   rate controlled   Metoprolol   no SOB, fatigue, dizziness, palpitations   monitor         Dementia (Multi)      Stable   baseline mentation   no outbursts or agitation per nursing   ensure safe environment   monitor          Medications, treatments, and labs reviewed  Continue medications and treatments as listed in EMR    Jessi Felton, APRN-CNP

## 2024-05-08 ENCOUNTER — NURSING HOME VISIT (OUTPATIENT)
Dept: POST ACUTE CARE | Facility: EXTERNAL LOCATION | Age: 84
End: 2024-05-08
Payer: MEDICARE

## 2024-05-08 DIAGNOSIS — K58.9 IRRITABLE BOWEL SYNDROME, UNSPECIFIED TYPE: ICD-10-CM

## 2024-05-08 DIAGNOSIS — F03.90 DEMENTIA, UNSPECIFIED DEMENTIA SEVERITY, UNSPECIFIED DEMENTIA TYPE, UNSPECIFIED WHETHER BEHAVIORAL, PSYCHOTIC, OR MOOD DISTURBANCE OR ANXIETY (MULTI): ICD-10-CM

## 2024-05-08 DIAGNOSIS — I48.20 CHRONIC ATRIAL FIBRILLATION (MULTI): Primary | ICD-10-CM

## 2024-05-08 DIAGNOSIS — I10 ESSENTIAL HYPERTENSION: ICD-10-CM

## 2024-05-08 DIAGNOSIS — N32.81 OVERACTIVE BLADDER: ICD-10-CM

## 2024-05-08 PROCEDURE — 99309 SBSQ NF CARE MODERATE MDM 30: CPT

## 2024-05-08 NOTE — LETTER
Patient: Lizette Oneill  : 1940    Encounter Date: 2024    PROGRESS NOTE    Subjective  Chief complaint: Lizette Oneill is a 83 y.o. female who is a long term care patient being seen and evaluated for general medical care and monthly follow-up    HPI:  Patient seen and evaluated for general medical care and monthly follow-up.  Patient at baseline mentation, calm, cooperative, pleasant.  Offers no complaints at time. A-fib stable- no SOB, dizziness, palpitations.  Appetite good.  No complaints of abdominal pain, N/V, constipation, diarrhea.  No concerns per nursing          Objective  Vital signs:  130/70-98.2-72-18-97%    Physical Exam  Constitutional:       Appearance: Normal appearance.   HENT:      Head: Normocephalic.      Mouth/Throat:      Mouth: Mucous membranes are moist.   Eyes:      Extraocular Movements: Extraocular movements intact.   Cardiovascular:      Rate and Rhythm: Normal rate. Rhythm irregular.      Pulses: Normal pulses.      Heart sounds: Normal heart sounds.   Pulmonary:      Effort: Pulmonary effort is normal.      Breath sounds: Normal breath sounds.   Abdominal:      General: Bowel sounds are normal.      Palpations: Abdomen is soft.   Musculoskeletal:         General: Normal range of motion.      Cervical back: Normal range of motion and neck supple.      Comments: .  Generalized weakness   Skin:     General: Skin is warm and dry.      Capillary Refill: Capillary refill takes less than 2 seconds.   Neurological:      Mental Status: She is alert. Mental status is at baseline.   Psychiatric:         Mood and Affect: Mood normal.         Behavior: Behavior normal.         Assessment/Plan  Problem List Items Addressed This Visit       Essential hypertension      Stable   Metoprolol   monitor         IBS (irritable bowel syndrome)      Stable   No abdominal pain, N/V/D, constipation   monitor         Overactive bladder      No urinary complaints   Monitor         Chronic atrial  fibrillation (Multi) - Primary      Stable   rate controlled   Metoprolol   no SOB, fatigue, dizziness, palpitations   monitor         Dementia (Multi)      Stable   baseline mentation   no outbursts or agitation per nursing   ensure safe environment   monitor          Medications, treatments, and labs reviewed  Continue medications and treatments as listed in EMR    YOANA Huddleston      Electronically Signed By: YOANA Huddleston   5/22/24  3:08 PM

## 2024-05-22 NOTE — PROGRESS NOTES
PROGRESS NOTE    Subjective   Chief complaint: Lizette Oneill is a 83 y.o. female who is a long term care patient being seen and evaluated for general medical care and monthly follow-up    HPI:  Patient seen and evaluated for general medical care and monthly follow-up.  Patient at baseline mentation, calm, cooperative, pleasant.  Offers no complaints at time. A-fib stable- no SOB, dizziness, palpitations.  Appetite good.  No complaints of abdominal pain, N/V, constipation, diarrhea.  No concerns per nursing          Objective   Vital signs:  130/70-98.2-72-18-97%    Physical Exam  Constitutional:       Appearance: Normal appearance.   HENT:      Head: Normocephalic.      Mouth/Throat:      Mouth: Mucous membranes are moist.   Eyes:      Extraocular Movements: Extraocular movements intact.   Cardiovascular:      Rate and Rhythm: Normal rate. Rhythm irregular.      Pulses: Normal pulses.      Heart sounds: Normal heart sounds.   Pulmonary:      Effort: Pulmonary effort is normal.      Breath sounds: Normal breath sounds.   Abdominal:      General: Bowel sounds are normal.      Palpations: Abdomen is soft.   Musculoskeletal:         General: Normal range of motion.      Cervical back: Normal range of motion and neck supple.      Comments: .  Generalized weakness   Skin:     General: Skin is warm and dry.      Capillary Refill: Capillary refill takes less than 2 seconds.   Neurological:      Mental Status: She is alert. Mental status is at baseline.   Psychiatric:         Mood and Affect: Mood normal.         Behavior: Behavior normal.         Assessment/Plan   Problem List Items Addressed This Visit       Essential hypertension      Stable   Metoprolol   monitor         IBS (irritable bowel syndrome)      Stable   No abdominal pain, N/V/D, constipation   monitor         Overactive bladder      No urinary complaints   Monitor         Chronic atrial fibrillation (Multi) - Primary      Stable   rate controlled    Metoprolol   no SOB, fatigue, dizziness, palpitations   monitor         Dementia (Multi)      Stable   baseline mentation   no outbursts or agitation per nursing   ensure safe environment   monitor          Medications, treatments, and labs reviewed  Continue medications and treatments as listed in EMR    Jessi Felton, APRN-CNP     normal...

## 2024-05-23 ENCOUNTER — OFFICE VISIT (OUTPATIENT)
Dept: DERMATOLOGY | Facility: CLINIC | Age: 84
End: 2024-05-23
Payer: MEDICARE

## 2024-05-23 DIAGNOSIS — L82.1 SEBORRHEIC KERATOSIS: ICD-10-CM

## 2024-05-23 DIAGNOSIS — L57.8 OTHER SKIN CHANGES DUE TO CHRONIC EXPOSURE TO NONIONIZING RADIATION: ICD-10-CM

## 2024-05-23 DIAGNOSIS — D48.5 NEOPLASM OF UNCERTAIN BEHAVIOR OF SKIN: Primary | ICD-10-CM

## 2024-05-23 DIAGNOSIS — L21.9 SEBORRHEIC DERMATITIS: ICD-10-CM

## 2024-05-23 PROCEDURE — 1159F MED LIST DOCD IN RCRD: CPT | Performed by: STUDENT IN AN ORGANIZED HEALTH CARE EDUCATION/TRAINING PROGRAM

## 2024-05-23 PROCEDURE — 99214 OFFICE O/P EST MOD 30 MIN: CPT | Performed by: STUDENT IN AN ORGANIZED HEALTH CARE EDUCATION/TRAINING PROGRAM

## 2024-05-23 RX ORDER — KETOCONAZOLE 20 MG/G
CREAM TOPICAL DAILY
Qty: 30 G | Refills: 11 | Status: SHIPPED | OUTPATIENT
Start: 2024-05-23

## 2024-05-23 NOTE — PROGRESS NOTES
Subjective     Lizette Oneill is a 83 y.o. female who presents for the following: Suspicious Skin Lesion (Patient is concerned with lesion on back. She is uncertain how long it has been there. History of skin cancer. BCC face.).     Review of Systems:  No other skin or systemic complaints other than what is documented elsewhere in the note.    The following portions of the chart were reviewed this encounter and updated as appropriate:          Skin Cancer History  2021- BCC forehead s/p Mohs      Specialty Problems          Dermatology Problems    Basal cell carcinoma of skin of other parts of face        Objective   Well appearing patient in no apparent distress; mood and affect are within normal limits.    A focused skin examination was performed. All findings within normal limits unless otherwise noted below.    Assessment/Plan   1. Neoplasm of uncertain behavior of skin  Mid Forehead  8 mm crusted and eroded pink papule          Lesion biopsy  Type of biopsy: tangential    Informed consent: discussed and consent obtained    Timeout: patient name, date of birth, surgical site, and procedure verified    Procedure prep:  Patient was prepped and draped  Anesthesia: the lesion was anesthetized in a standard fashion    Anesthetic:  1% lidocaine w/ epinephrine 1-100,000 local infiltration  Instrument used: DermaBlade    Hemostasis achieved with: aluminum chloride    Outcome: patient tolerated procedure well    Post-procedure details: sterile dressing applied and wound care instructions given    Dressing type: petrolatum and bandage      Staff Communication: Dermatology Local Anesthesia: 1 % Lidocaine / Epinephrine - Amount: 2 cc    Specimen 1 - Dermatopathology- DERM LAB  Differential Diagnosis: isk vs scc  Check Margins Yes/No?:    Comments:    Dermpath Lab: Routine Histopathology (formalin-fixed tissue)    Concerning lesion found on exam. DDX for lesion includes: isk vs scc. The need for biopsy to aid in diagnosis was  discussed and recommended. Risks and benefits of biopsy were reviewed. See procedure note.    2. Seborrheic dermatitis  Glabella  Erythema with overlying greasy scale.    Discussed the chronic and relapsing nature of the condition. Counseled on relation to normal yeast species on skin and body's immune reaction to it. Discussed that goal is control, not cure, of condition.     Start ketoconazole 2% cream daily to affected area.        ketoconazole (NIZOral) 2 % cream - Glabella  Apply topically once daily. To itchy areas around forehead and eyebrows    3. Seborrheic keratosis  Stuck on verrucous, tan-brown papules and plaques.      The benign nature of these skin lesions were reviewed with the patient today and reassurance was provided. Patient advised to return for f/u if the lesions change in size, shape, color, become painful, tender, itch or bleed.    4. Other skin changes due to chronic exposure to nonionizing radiation  Mottled pigmentation, telangiectasias and brown reticular macules in sun exposed areas on the face, extremities, trunk    The risk of chronic, cumulative sun damage and risk of development of skin cancer was reviewed with the patient today. Discussed important of sun protection with sun protective clothing and/or broad spectrum sunscreen spf 30 or above.  Warning signs of skin cancer were reviewed. Patient to contact office should they notice any new or changing pre-existing skin lesion.

## 2024-05-23 NOTE — PATIENT INSTRUCTIONS
The spots on your back are called seborrheic keratoses. These are totally benign and do not require treatment. I recommend using a moisturizer to the affected area once or twice daily to prevent itching.  We did a biopsy of your forehead to rule out skin cancer. Please follow wound care instructions that were handed out to you at the end of the appointment.  For the itching on your forehead I recommend a once daily application of ketoconazole 2% cream to affected area. Rx was given today    We will call you with biopsy results and plan in 1-2 weeks.

## 2024-05-28 LAB
LABORATORY COMMENT REPORT: NORMAL
PATH REPORT.FINAL DX SPEC: NORMAL
PATH REPORT.GROSS SPEC: NORMAL
PATH REPORT.MICROSCOPIC SPEC OTHER STN: NORMAL
PATH REPORT.RELEVANT HX SPEC: NORMAL
PATH REPORT.TOTAL CANCER: NORMAL

## 2024-06-04 NOTE — RESULT ENCOUNTER NOTE
Spoke with patient's nurse regarding mid forehead shave biopsy results: Per Dr. Clarke, benign Seborrheic Keratosis.

## 2024-06-12 ENCOUNTER — NURSING HOME VISIT (OUTPATIENT)
Dept: POST ACUTE CARE | Facility: EXTERNAL LOCATION | Age: 84
End: 2024-06-12
Payer: MEDICARE

## 2024-06-12 DIAGNOSIS — K21.9 GERD WITHOUT ESOPHAGITIS: ICD-10-CM

## 2024-06-12 DIAGNOSIS — I10 ESSENTIAL HYPERTENSION: ICD-10-CM

## 2024-06-12 DIAGNOSIS — F03.90 DEMENTIA, UNSPECIFIED DEMENTIA SEVERITY, UNSPECIFIED DEMENTIA TYPE, UNSPECIFIED WHETHER BEHAVIORAL, PSYCHOTIC, OR MOOD DISTURBANCE OR ANXIETY (MULTI): ICD-10-CM

## 2024-06-12 DIAGNOSIS — I48.20 CHRONIC ATRIAL FIBRILLATION (MULTI): Primary | ICD-10-CM

## 2024-06-12 PROCEDURE — 99309 SBSQ NF CARE MODERATE MDM 30: CPT

## 2024-06-12 NOTE — LETTER
Patient: Lizette Oneill  : 1940    Encounter Date: 2024    PROGRESS NOTE    Subjective  Chief complaint: Lizette Oneill is a 83 y.o. female who is a long term care patient being seen and evaluated for general medical care and monthly follow-up    HPI:  Patient seen and evaluated for general medical care and monthly follow-up.  Patient at baseline mentation, calm, cooperative, pleasant.  Offers no complaints at time.  Continues to work with therapy for strengthening and mobility.  BP at goal.  A-fib stable- no SOB, dizziness, palpitations.  Engages with other residents in activities and meals. Appetite good. No concerns per nursing          Objective  Vital signs: 136/84-98.0-68-18-97%    Physical Exam  Constitutional:       Appearance: Normal appearance.   HENT:      Head: Normocephalic.      Mouth/Throat:      Mouth: Mucous membranes are moist.   Eyes:      Extraocular Movements: Extraocular movements intact.   Cardiovascular:      Rate and Rhythm: Normal rate. Rhythm irregular.      Pulses: Normal pulses.      Heart sounds: Normal heart sounds.   Pulmonary:      Effort: Pulmonary effort is normal.      Breath sounds: Normal breath sounds.   Abdominal:      General: Bowel sounds are normal.      Palpations: Abdomen is soft.   Musculoskeletal:         General: Normal range of motion.      Cervical back: Normal range of motion and neck supple.      Comments: weakness   Skin:     General: Skin is warm and dry.      Capillary Refill: Capillary refill takes less than 2 seconds.   Neurological:      Mental Status: She is alert. Mental status is at baseline.   Psychiatric:         Mood and Affect: Mood normal.         Behavior: Behavior normal.         Assessment/Plan  Problem List Items Addressed This Visit       Essential hypertension      Stable   Metoprolol   monitor         GERD without esophagitis      Stable   no epigastric pain or acidic taste in mouth   Omeprazole   monitor         Chronic atrial  fibrillation (Multi) - Primary      Stable   rate controlled   Metoprolol   no SOB, fatigue, dizziness, palpitations   monitor         Dementia (Multi)      Stable   baseline mentation   no outbursts or agitation per nursing   ensure safe environment   monitor          Medications, treatments, and labs reviewed  Continue medications and treatments as listed in EMR    YOANA Huddleston      Electronically Signed By: YOANA Huddleston   6/24/24  9:00 PM

## 2024-06-25 NOTE — PROGRESS NOTES
PROGRESS NOTE    Subjective   Chief complaint: Lizette Oneill is a 83 y.o. female who is a long term care patient being seen and evaluated for general medical care and monthly follow-up    HPI:  Patient seen and evaluated for general medical care and monthly follow-up.  Patient at baseline mentation, calm, cooperative, pleasant.  Offers no complaints at time.  Continues to work with therapy for strengthening and mobility.  BP at goal.  A-fib stable- no SOB, dizziness, palpitations.  Engages with other residents in activities and meals. Appetite good. No concerns per nursing          Objective   Vital signs: 136/84-98.0-68-18-97%    Physical Exam  Constitutional:       Appearance: Normal appearance.   HENT:      Head: Normocephalic.      Mouth/Throat:      Mouth: Mucous membranes are moist.   Eyes:      Extraocular Movements: Extraocular movements intact.   Cardiovascular:      Rate and Rhythm: Normal rate. Rhythm irregular.      Pulses: Normal pulses.      Heart sounds: Normal heart sounds.   Pulmonary:      Effort: Pulmonary effort is normal.      Breath sounds: Normal breath sounds.   Abdominal:      General: Bowel sounds are normal.      Palpations: Abdomen is soft.   Musculoskeletal:         General: Normal range of motion.      Cervical back: Normal range of motion and neck supple.      Comments: weakness   Skin:     General: Skin is warm and dry.      Capillary Refill: Capillary refill takes less than 2 seconds.   Neurological:      Mental Status: She is alert. Mental status is at baseline.   Psychiatric:         Mood and Affect: Mood normal.         Behavior: Behavior normal.         Assessment/Plan   Problem List Items Addressed This Visit       Essential hypertension      Stable   Metoprolol   monitor         GERD without esophagitis      Stable   no epigastric pain or acidic taste in mouth   Omeprazole   monitor         Chronic atrial fibrillation (Multi) - Primary      Stable   rate controlled    Metoprolol   no SOB, fatigue, dizziness, palpitations   monitor         Dementia (Multi)      Stable   baseline mentation   no outbursts or agitation per nursing   ensure safe environment   monitor          Medications, treatments, and labs reviewed  Continue medications and treatments as listed in EMR    Jessi Felton, APRN-CNP

## 2024-07-05 ENCOUNTER — NURSING HOME VISIT (OUTPATIENT)
Dept: POST ACUTE CARE | Facility: EXTERNAL LOCATION | Age: 84
End: 2024-07-05
Payer: MEDICARE

## 2024-07-05 DIAGNOSIS — I48.20 CHRONIC ATRIAL FIBRILLATION (MULTI): Primary | ICD-10-CM

## 2024-07-05 DIAGNOSIS — F03.90 DEMENTIA, UNSPECIFIED DEMENTIA SEVERITY, UNSPECIFIED DEMENTIA TYPE, UNSPECIFIED WHETHER BEHAVIORAL, PSYCHOTIC, OR MOOD DISTURBANCE OR ANXIETY (MULTI): ICD-10-CM

## 2024-07-05 DIAGNOSIS — I10 ESSENTIAL HYPERTENSION: ICD-10-CM

## 2024-07-05 DIAGNOSIS — K21.9 GERD WITHOUT ESOPHAGITIS: ICD-10-CM

## 2024-07-05 PROCEDURE — 99309 SBSQ NF CARE MODERATE MDM 30: CPT

## 2024-07-05 NOTE — LETTER
Patient: Lizette Oneill  : 1940    Encounter Date: 2024    PROGRESS NOTE    Subjective  Chief complaint: Lizette Oneill is a 83 y.o. female who is a long term care patient being seen and evaluated for general medical care and monthly follow-up.    HPI:  Patient seen and evaluated for general medical care and monthly follow-up.  Patient at baseline mentation, cooperative and pleasant. No outbursts noted per nursing. BP at goal.  A-fib stable - no dizziness, palpitations or SOB.  Patient offers no complaints of F/C/N/V/D, cough, SOB.  No concerns per nursing.          Objective  Vital signs:   110/85-61-86-18-98%    Physical Exam  Constitutional:       Appearance: Normal appearance.   HENT:      Head: Normocephalic.      Mouth/Throat:      Mouth: Mucous membranes are moist.   Eyes:      Extraocular Movements: Extraocular movements intact.   Cardiovascular:      Rate and Rhythm: Normal rate. Rhythm irregular.      Pulses: Normal pulses.      Heart sounds: Normal heart sounds.   Pulmonary:      Effort: Pulmonary effort is normal.      Breath sounds: Normal breath sounds.   Abdominal:      General: Bowel sounds are normal.      Palpations: Abdomen is soft.   Musculoskeletal:         General: Normal range of motion.      Cervical back: Normal range of motion and neck supple.      Comments: weakness   Skin:     General: Skin is warm and dry.      Capillary Refill: Capillary refill takes less than 2 seconds.   Neurological:      Mental Status: She is alert. Mental status is at baseline.   Psychiatric:         Mood and Affect: Mood normal.         Behavior: Behavior normal.         Assessment/Plan  Problem List Items Addressed This Visit       Essential hypertension      Stable   Metoprolol   monitor         GERD without esophagitis      Stable   no epigastric pain or acidic taste in mouth   Omeprazole   monitor         Chronic atrial fibrillation (Multi) - Primary      Stable   rate controlled   Metoprolol   no SOB,  fatigue, dizziness, palpitations   monitor         Dementia (Multi)      Stable   baseline mentation   no outbursts or agitation per nursing   ensure safe environment   monitor          Medications, treatments, and labs reviewed  Continue medications and treatments as listed in EMR    YOANA Huddleston      Electronically Signed By: YOANA Huddleston   7/16/24  4:52 PM

## 2024-07-16 NOTE — PROGRESS NOTES
PROGRESS NOTE    Subjective   Chief complaint: Lizette Oneill is a 83 y.o. female who is a long term care patient being seen and evaluated for general medical care and monthly follow-up.    HPI:  Patient seen and evaluated for general medical care and monthly follow-up.  Patient at baseline mentation, cooperative and pleasant. No outbursts noted per nursing. BP at goal.  A-fib stable - no dizziness, palpitations or SOB.  Patient offers no complaints of F/C/N/V/D, cough, SOB.  No concerns per nursing.          Objective   Vital signs:   110/24-58-63-18-98%    Physical Exam  Constitutional:       Appearance: Normal appearance.   HENT:      Head: Normocephalic.      Mouth/Throat:      Mouth: Mucous membranes are moist.   Eyes:      Extraocular Movements: Extraocular movements intact.   Cardiovascular:      Rate and Rhythm: Normal rate. Rhythm irregular.      Pulses: Normal pulses.      Heart sounds: Normal heart sounds.   Pulmonary:      Effort: Pulmonary effort is normal.      Breath sounds: Normal breath sounds.   Abdominal:      General: Bowel sounds are normal.      Palpations: Abdomen is soft.   Musculoskeletal:         General: Normal range of motion.      Cervical back: Normal range of motion and neck supple.      Comments: weakness   Skin:     General: Skin is warm and dry.      Capillary Refill: Capillary refill takes less than 2 seconds.   Neurological:      Mental Status: She is alert. Mental status is at baseline.   Psychiatric:         Mood and Affect: Mood normal.         Behavior: Behavior normal.         Assessment/Plan   Problem List Items Addressed This Visit       Essential hypertension      Stable   Metoprolol   monitor         GERD without esophagitis      Stable   no epigastric pain or acidic taste in mouth   Omeprazole   monitor         Chronic atrial fibrillation (Multi) - Primary      Stable   rate controlled   Metoprolol   no SOB, fatigue, dizziness, palpitations   monitor         Dementia  (Multi)      Stable   baseline mentation   no outbursts or agitation per nursing   ensure safe environment   monitor          Medications, treatments, and labs reviewed  Continue medications and treatments as listed in EMR    Jessi Felton, APRN-CNP

## 2024-07-25 ENCOUNTER — NURSING HOME VISIT (OUTPATIENT)
Dept: POST ACUTE CARE | Facility: EXTERNAL LOCATION | Age: 84
End: 2024-07-25
Payer: MEDICARE

## 2024-07-25 DIAGNOSIS — F03.90 DEMENTIA, UNSPECIFIED DEMENTIA SEVERITY, UNSPECIFIED DEMENTIA TYPE, UNSPECIFIED WHETHER BEHAVIORAL, PSYCHOTIC, OR MOOD DISTURBANCE OR ANXIETY (MULTI): ICD-10-CM

## 2024-07-25 DIAGNOSIS — I48.20 CHRONIC ATRIAL FIBRILLATION (MULTI): Primary | ICD-10-CM

## 2024-07-25 DIAGNOSIS — R10.84 GENERALIZED ABDOMINAL PAIN: ICD-10-CM

## 2024-07-25 DIAGNOSIS — I10 ESSENTIAL HYPERTENSION: ICD-10-CM

## 2024-07-25 PROCEDURE — 99308 SBSQ NF CARE LOW MDM 20: CPT

## 2024-07-25 NOTE — PROGRESS NOTES
PROGRESS NOTE    Subjective   Chief complaint: Lizette Oneill is a 83 y.o. female who is a long term care patient being seen and evaluated for  abdominal pain    HPI:  Patient evaluated for abdominal pain.  Offers no complaints of F/C/N/V/D.  Last BM today.  Appetite fair.  A-fib stable - no SOB, dizziness, palpitations.  No other concerns per nursing          Objective   Vital signs:  131/73 - 97.1-92-18-98%    Physical Exam  Constitutional:       Appearance: Normal appearance.   HENT:      Head: Normocephalic.      Mouth/Throat:      Mouth: Mucous membranes are moist.   Eyes:      Extraocular Movements: Extraocular movements intact.   Cardiovascular:      Rate and Rhythm: Normal rate. Rhythm irregular.      Pulses: Normal pulses.      Heart sounds: Normal heart sounds.   Pulmonary:      Effort: Pulmonary effort is normal.      Breath sounds: Normal breath sounds.   Abdominal:      General: Bowel sounds are normal.      Palpations: Abdomen is soft.   Musculoskeletal:         General: Normal range of motion.      Cervical back: Normal range of motion and neck supple.      Comments: weakness   Skin:     General: Skin is warm and dry.      Capillary Refill: Capillary refill takes less than 2 seconds.   Neurological:      Mental Status: She is alert. Mental status is at baseline.   Psychiatric:         Mood and Affect: Mood normal.         Behavior: Behavior normal.         Assessment/Plan   Problem List Items Addressed This Visit       Essential hypertension      Stable   Metoprolol   monitor         Chronic atrial fibrillation (Multi) - Primary      Stable   rate controlled   Metoprolol   no SOB, fatigue, dizziness, palpitations   monitor         Dementia (Multi)      Stable   baseline mentation   no outbursts or agitation per nursing   ensure safe environment   monitor         Generalized abdominal pain      No constitutional symptoms   UA CNS, CBC, CMP, amylase, lipase, and ammonia level   KUB per [employer]           Medications, treatments, and labs reviewed  Continue medications and treatments as listed in EMR    Jessi Felton, APRN-CNP

## 2024-07-25 NOTE — LETTER
Patient: Lizette Oneill  : 1940    Encounter Date: 2024    PROGRESS NOTE    Subjective  Chief complaint: Lizette Oneill is a 83 y.o. female who is a long term care patient being seen and evaluated for  abdominal pain    HPI:  Patient evaluated for abdominal pain.  Offers no complaints of F/C/N/V/D.  Last BM today.  Appetite fair.  A-fib stable - no SOB, dizziness, palpitations.  No other concerns per nursing          Objective  Vital signs:  131/73 - 97.1-92-18-98%    Physical Exam  Constitutional:       Appearance: Normal appearance.   HENT:      Head: Normocephalic.      Mouth/Throat:      Mouth: Mucous membranes are moist.   Eyes:      Extraocular Movements: Extraocular movements intact.   Cardiovascular:      Rate and Rhythm: Normal rate. Rhythm irregular.      Pulses: Normal pulses.      Heart sounds: Normal heart sounds.   Pulmonary:      Effort: Pulmonary effort is normal.      Breath sounds: Normal breath sounds.   Abdominal:      General: Bowel sounds are normal.      Palpations: Abdomen is soft.   Musculoskeletal:         General: Normal range of motion.      Cervical back: Normal range of motion and neck supple.      Comments: weakness   Skin:     General: Skin is warm and dry.      Capillary Refill: Capillary refill takes less than 2 seconds.   Neurological:      Mental Status: She is alert. Mental status is at baseline.   Psychiatric:         Mood and Affect: Mood normal.         Behavior: Behavior normal.         Assessment/Plan  Problem List Items Addressed This Visit       Essential hypertension      Stable   Metoprolol   monitor         Chronic atrial fibrillation (Multi) - Primary      Stable   rate controlled   Metoprolol   no SOB, fatigue, dizziness, palpitations   monitor         Dementia (Multi)      Stable   baseline mentation   no outbursts or agitation per nursing   ensure safe environment   monitor         Generalized abdominal pain      No constitutional symptoms   UA CNS, CBC,  CMP, amylase, lipase, and ammonia level   KUB per [employer]          Medications, treatments, and labs reviewed  Continue medications and treatments as listed in EMR    YOANA Huddleston      Electronically Signed By: YOANA Huddleston   7/25/24  5:32 PM

## 2024-07-25 NOTE — ASSESSMENT & PLAN NOTE
No constitutional symptoms   UA CNS, CBC, CMP, amylase, lipase, and ammonia level   KUB per [employer]  
 Stable   Metoprolol   monitor  
 Stable   baseline mentation   no outbursts or agitation per nursing   ensure safe environment   monitor  
 Stable   rate controlled   Metoprolol   no SOB, fatigue, dizziness, palpitations   monitor  
Home

## 2024-08-02 ENCOUNTER — NURSING HOME VISIT (OUTPATIENT)
Dept: POST ACUTE CARE | Facility: EXTERNAL LOCATION | Age: 84
End: 2024-08-02
Payer: MEDICARE

## 2024-08-02 DIAGNOSIS — I10 ESSENTIAL HYPERTENSION: ICD-10-CM

## 2024-08-02 DIAGNOSIS — I48.20 CHRONIC ATRIAL FIBRILLATION (MULTI): Primary | ICD-10-CM

## 2024-08-02 DIAGNOSIS — R11.14 BILIOUS VOMITING WITH NAUSEA: ICD-10-CM

## 2024-08-02 DIAGNOSIS — F03.90 DEMENTIA, UNSPECIFIED DEMENTIA SEVERITY, UNSPECIFIED DEMENTIA TYPE, UNSPECIFIED WHETHER BEHAVIORAL, PSYCHOTIC, OR MOOD DISTURBANCE OR ANXIETY (MULTI): ICD-10-CM

## 2024-08-02 PROCEDURE — 99309 SBSQ NF CARE MODERATE MDM 30: CPT

## 2024-08-02 NOTE — LETTER
Patient: Lizette Oneill  : 1940    Encounter Date: 2024    PROGRESS NOTE    Subjective  Chief complaint: Lizette Oneill is a 83 y.o. female who is a long term care patient being seen and evaluated for nausea and vomiting    HPI:  Patient seen and evaluated for ongoing nausea and vomiting.  Pending ST evaluation.  COVID negative.  Labs obtained.  WBC 9.3, H/H 12.5/38.7 BUN/CR 19/0.8, GFR 83, Rakel 28. Lipase 119.  IVF initiated NS 60ml/hr. Patient offers no complaints at time. No F/C. Appetite fair. Sleeping well. No concerns per nursing.          Objective  Vital signs: 131/73-97.1-92-18-98%    Physical Exam  Constitutional:       Appearance: Normal appearance.   HENT:      Head: Normocephalic.      Mouth/Throat:      Mouth: Mucous membranes are moist.   Eyes:      Extraocular Movements: Extraocular movements intact.   Cardiovascular:      Rate and Rhythm: Normal rate. Rhythm irregular.      Pulses: Normal pulses.      Heart sounds: Normal heart sounds.   Pulmonary:      Effort: Pulmonary effort is normal.      Breath sounds: Normal breath sounds.   Abdominal:      General: Bowel sounds are normal.      Palpations: Abdomen is soft.   Musculoskeletal:         General: Normal range of motion.      Cervical back: Normal range of motion and neck supple.      Comments: weakness   Skin:     General: Skin is warm and dry.      Capillary Refill: Capillary refill takes less than 2 seconds.   Neurological:      Mental Status: She is alert. Mental status is at baseline.   Psychiatric:         Mood and Affect: Mood normal.         Behavior: Behavior normal.         Assessment/Plan  Problem List Items Addressed This Visit       Essential hypertension      Stable   Metoprolol   monitor         Chronic atrial fibrillation (Multi) - Primary      Stable   rate controlled   Metoprolol   no SOB, fatigue, dizziness, palpitations   monitor         Dementia (Multi)      Stable   baseline mentation   no outbursts or agitation per  nursing   ensure safe environment   monitor         Bilious vomiting with nausea     Continue zofran  N/V throughout day  Labs demonstrate elevated amylase and lipase  Pending US results          Medications, treatments, and labs reviewed  Continue medications and treatments as listed in EMR    YOANA Huddleston      Electronically Signed By: YOANA Huddleston   8/8/24 10:40 PM

## 2024-08-07 ENCOUNTER — NURSING HOME VISIT (OUTPATIENT)
Dept: POST ACUTE CARE | Facility: EXTERNAL LOCATION | Age: 84
End: 2024-08-07
Payer: MEDICARE

## 2024-08-07 DIAGNOSIS — I48.20 CHRONIC ATRIAL FIBRILLATION (MULTI): Primary | ICD-10-CM

## 2024-08-07 DIAGNOSIS — I10 ESSENTIAL HYPERTENSION: ICD-10-CM

## 2024-08-07 DIAGNOSIS — R10.84 GENERALIZED ABDOMINAL PAIN: ICD-10-CM

## 2024-08-07 DIAGNOSIS — F03.90 DEMENTIA, UNSPECIFIED DEMENTIA SEVERITY, UNSPECIFIED DEMENTIA TYPE, UNSPECIFIED WHETHER BEHAVIORAL, PSYCHOTIC, OR MOOD DISTURBANCE OR ANXIETY (MULTI): ICD-10-CM

## 2024-08-07 DIAGNOSIS — R11.14 BILIOUS VOMITING WITH NAUSEA: ICD-10-CM

## 2024-08-07 PROCEDURE — 99309 SBSQ NF CARE MODERATE MDM 30: CPT

## 2024-08-07 NOTE — LETTER
Patient: Lizette Oneill  : 1940    Encounter Date: 2024    PROGRESS NOTE    Subjective  Chief complaint: Lizette Oneill is a 83 y.o. female who is a long term care patient being seen and evaluated for N/V    HPI:  Patient seen and evaluated for continues N/V. Endorses abdominal discomfort. No F/C constipation/diarrhea. Evaluated by opt[facility]- CT ordered. Pending endoscopy. Recent labs demonstrate Na/Cl 134/94 with nl hepatic profile and nl CBC. Tolerating phenergan well. Patient offers no complaints of dizziness.  No other concerns per nursing.          Objective  Vital signs: 120/72-97.1-80-20-98%    Physical Exam  Constitutional:       Appearance: Normal appearance.   HENT:      Head: Normocephalic.      Mouth/Throat:      Mouth: Mucous membranes are moist.   Eyes:      Extraocular Movements: Extraocular movements intact.   Cardiovascular:      Rate and Rhythm: Normal rate. Rhythm irregular.      Pulses: Normal pulses.      Heart sounds: Normal heart sounds.   Pulmonary:      Effort: Pulmonary effort is normal.      Breath sounds: Normal breath sounds.   Abdominal:      General: Bowel sounds are normal.      Palpations: Abdomen is soft.   Musculoskeletal:         General: Normal range of motion.      Cervical back: Normal range of motion and neck supple.      Comments: weakness   Skin:     General: Skin is warm and dry.      Capillary Refill: Capillary refill takes less than 2 seconds.   Neurological:      Mental Status: She is alert. Mental status is at baseline.   Psychiatric:         Mood and Affect: Mood normal.         Behavior: Behavior normal.         Assessment/Plan  Problem List Items Addressed This Visit       Essential hypertension      Stable   Metoprolol   monitor         Chronic atrial fibrillation (Multi) - Primary      Stable   rate controlled   Metoprolol   no SOB, fatigue, dizziness, palpitations   monitor         Dementia (Multi)      Stable   baseline mentation   no outbursts or agitation  per nursing   ensure safe environment   monitor         Generalized abdominal pain     Accompanied by N/V  recent labs  NL hepatic profile and CBC  Na/Cl 134/94  Pending endoscopy and CT         Bilious vomiting with nausea     Continue phenergan  N/V throughout day  Normalizing Rakel/Lip, nl Hepatic profile, nl CBC, Na/Cl 134/94  Pending CT and endoscopy          Medications, treatments, and labs reviewed  Continue medications and treatments as listed in EMR    YOANA Huddleston      Electronically Signed By: YOANA Huddleston   8/23/24 12:40 PM

## 2024-08-08 PROBLEM — R11.14 BILIOUS VOMITING WITH NAUSEA: Status: ACTIVE | Noted: 2024-08-08

## 2024-08-09 NOTE — PROGRESS NOTES
PROGRESS NOTE    Subjective   Chief complaint: Lizette Oneill is a 83 y.o. female who is a long term care patient being seen and evaluated for nausea and vomiting    HPI:  Patient seen and evaluated for ongoing nausea and vomiting.  Pending ST evaluation.  COVID negative.  Labs obtained.  WBC 9.3, H/H 12.5/38.7 BUN/CR 19/0.8, GFR 83, Rakel 28. Lipase 119.  IVF initiated NS 60ml/hr. Patient offers no complaints at time. No F/C. Appetite fair. Sleeping well. No concerns per nursing.          Objective   Vital signs: 131/73-97.1-92-18-98%    Physical Exam  Constitutional:       Appearance: Normal appearance.   HENT:      Head: Normocephalic.      Mouth/Throat:      Mouth: Mucous membranes are moist.   Eyes:      Extraocular Movements: Extraocular movements intact.   Cardiovascular:      Rate and Rhythm: Normal rate. Rhythm irregular.      Pulses: Normal pulses.      Heart sounds: Normal heart sounds.   Pulmonary:      Effort: Pulmonary effort is normal.      Breath sounds: Normal breath sounds.   Abdominal:      General: Bowel sounds are normal.      Palpations: Abdomen is soft.   Musculoskeletal:         General: Normal range of motion.      Cervical back: Normal range of motion and neck supple.      Comments: weakness   Skin:     General: Skin is warm and dry.      Capillary Refill: Capillary refill takes less than 2 seconds.   Neurological:      Mental Status: She is alert. Mental status is at baseline.   Psychiatric:         Mood and Affect: Mood normal.         Behavior: Behavior normal.         Assessment/Plan   Problem List Items Addressed This Visit       Essential hypertension      Stable   Metoprolol   monitor         Chronic atrial fibrillation (Multi) - Primary      Stable   rate controlled   Metoprolol   no SOB, fatigue, dizziness, palpitations   monitor         Dementia (Multi)      Stable   baseline mentation   no outbursts or agitation per nursing   ensure safe environment   monitor         Bilious vomiting  with nausea     Continue zofran  N/V throughout day  Labs demonstrate elevated amylase and lipase  Pending US results          Medications, treatments, and labs reviewed  Continue medications and treatments as listed in EMR    Jessi Felton, APRN-CNP

## 2024-08-09 NOTE — ASSESSMENT & PLAN NOTE
Continue zofran  N/V throughout day  Labs demonstrate elevated amylase and lipase  Pending US results

## 2024-08-13 ENCOUNTER — NURSING HOME VISIT (OUTPATIENT)
Dept: POST ACUTE CARE | Facility: EXTERNAL LOCATION | Age: 84
End: 2024-08-13
Payer: MEDICARE
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
## 2024-08-13 DIAGNOSIS — F03.90 DEMENTIA, UNSPECIFIED DEMENTIA SEVERITY, UNSPECIFIED DEMENTIA TYPE, UNSPECIFIED WHETHER BEHAVIORAL, PSYCHOTIC, OR MOOD DISTURBANCE OR ANXIETY (MULTI): ICD-10-CM

## 2024-08-13 DIAGNOSIS — I10 ESSENTIAL HYPERTENSION: ICD-10-CM

## 2024-08-13 DIAGNOSIS — R11.14 BILIOUS VOMITING WITH NAUSEA: ICD-10-CM

## 2024-08-13 NOTE — LETTER
Patient: Lizette Oneill  : 1940    Encounter Date: 2024    PROGRESS NOTE    Subjective  Chief complaint: Lizette Oneill is a 83 y.o. female who is a long term care patient being seen and evaluated for fall    HPI:  Patient seen and evaluated s/p fall. No injuries reported. Patient states self transferring and falling. Denies striking head. Full ROM in all extremities. No further concerns per nursing.           Objective  Vital signs: 120/72-97.1-80-20-98%    Physical Exam  Constitutional:       Appearance: Normal appearance.   HENT:      Head: Normocephalic.      Mouth/Throat:      Mouth: Mucous membranes are moist.   Eyes:      Extraocular Movements: Extraocular movements intact.   Cardiovascular:      Rate and Rhythm: Normal rate. Rhythm irregular.      Pulses: Normal pulses.      Heart sounds: Normal heart sounds.   Pulmonary:      Effort: Pulmonary effort is normal.      Breath sounds: Normal breath sounds.   Abdominal:      General: Bowel sounds are normal.      Palpations: Abdomen is soft.   Musculoskeletal:         General: Normal range of motion.      Cervical back: Normal range of motion and neck supple.      Comments: weakness   Skin:     General: Skin is warm and dry.      Capillary Refill: Capillary refill takes less than 2 seconds.   Neurological:      Mental Status: She is alert. Mental status is at baseline.   Psychiatric:         Mood and Affect: Mood normal.         Behavior: Behavior normal.         Assessment/Plan  Problem List Items Addressed This Visit       Essential hypertension      Stable   Metoprolol   monitor         Dementia (Multi)      Stable   baseline mentation   no outbursts or agitation per nursing   ensure safe environment   monitor         Bilious vomiting with nausea     Continue phenergan  N/V throughout day  Normalizing Rakel/Lip, nl Hepatic profile, nl CBC, Na/Cl 134/94  Pending CT and endoscopy         Fall - Primary     No injuries reported  Continue calling for  assistance when needed  Maintain proper footwear, clear pathways and adequate lighting            Medications, treatments, and labs reviewed  Continue medications and treatments as listed in EMR    YOANA Huddleston      Electronically Signed By: YOANA Huddleston   8/23/24  1:01 PM

## 2024-08-15 ENCOUNTER — NURSING HOME VISIT (OUTPATIENT)
Dept: POST ACUTE CARE | Facility: EXTERNAL LOCATION | Age: 84
End: 2024-08-15
Payer: MEDICARE

## 2024-08-15 DIAGNOSIS — I10 ESSENTIAL HYPERTENSION: ICD-10-CM

## 2024-08-15 DIAGNOSIS — K21.9 GERD WITHOUT ESOPHAGITIS: ICD-10-CM

## 2024-08-15 DIAGNOSIS — I48.20 CHRONIC ATRIAL FIBRILLATION (MULTI): ICD-10-CM

## 2024-08-15 DIAGNOSIS — F03.90 DEMENTIA, UNSPECIFIED DEMENTIA SEVERITY, UNSPECIFIED DEMENTIA TYPE, UNSPECIFIED WHETHER BEHAVIORAL, PSYCHOTIC, OR MOOD DISTURBANCE OR ANXIETY (MULTI): ICD-10-CM

## 2024-08-15 DIAGNOSIS — R11.14 BILIOUS VOMITING WITH NAUSEA: Primary | ICD-10-CM

## 2024-08-15 PROCEDURE — 99309 SBSQ NF CARE MODERATE MDM 30: CPT

## 2024-08-15 NOTE — ASSESSMENT & PLAN NOTE
Continue zofran  N/V throughout day  Normalizing elevated amylase and lipase  Pending  EGD results

## 2024-08-15 NOTE — PROGRESS NOTES
PROGRESS NOTE    Subjective   Chief complaint: Lizette Oneill is a 83 y.o. female who is a long term care patient being seen and evaluated for general medical care and monthly follow-up    HPI:  Patient seen and evaluated for general medical care and monthly follow-up.  Patient at baseline mentation, cooperative and pleasant. No outbursts noted per nursing. BP at goal.  A-fib stable - no dizziness, palpitations or SOB.  Patient offers no complaints of F/C/V, cough, SOB.  +N/V.  Recent endoscopy (8/12).  Pending results. No concerns per nursing.          Objective   Vital signs:  120/72, 97.1, 80, 20, 98%    Physical Exam  Constitutional:       Appearance: Normal appearance.   HENT:      Head: Normocephalic.      Mouth/Throat:      Mouth: Mucous membranes are moist.   Eyes:      Extraocular Movements: Extraocular movements intact.   Cardiovascular:      Rate and Rhythm: Normal rate. Rhythm irregular.      Pulses: Normal pulses.      Heart sounds: Normal heart sounds.   Pulmonary:      Effort: Pulmonary effort is normal.      Breath sounds: Normal breath sounds.   Abdominal:      General: Bowel sounds are normal.      Palpations: Abdomen is soft.   Musculoskeletal:         General: Normal range of motion.      Cervical back: Normal range of motion and neck supple.      Comments: weakness   Skin:     General: Skin is warm and dry.      Capillary Refill: Capillary refill takes less than 2 seconds.   Neurological:      Mental Status: She is alert. Mental status is at baseline.   Psychiatric:         Mood and Affect: Mood normal.         Behavior: Behavior normal.         Assessment/Plan   Problem List Items Addressed This Visit       Essential hypertension      Stable   Metoprolol   monitor         GERD without esophagitis      Stable   no epigastric pain or acidic taste in mouth   Omeprazole   monitor         Chronic atrial fibrillation (Multi)      Stable   rate controlled   Metoprolol   no SOB, fatigue, dizziness,  palpitations   monitor         Dementia (Multi)      Stable   baseline mentation   no outbursts or agitation per nursing   ensure safe environment   monitor         Bilious vomiting with nausea - Primary     Continue zofran  N/V throughout day  Normalizing elevated amylase and lipase  Pending  EGD results          Medications, treatments, and labs reviewed  Continue medications and treatments as listed in EMR    Jessi Felton, APRN-CNP

## 2024-08-15 NOTE — LETTER
Patient: Lizette Oneill  : 1940    Encounter Date: 08/15/2024    PROGRESS NOTE    Subjective  Chief complaint: Lizette Oneill is a 83 y.o. female who is a long term care patient being seen and evaluated for general medical care and monthly follow-up    HPI:  Patient seen and evaluated for general medical care and monthly follow-up.  Patient at baseline mentation, cooperative and pleasant. No outbursts noted per nursing. BP at goal.  A-fib stable - no dizziness, palpitations or SOB.  Patient offers no complaints of F/C/V, cough, SOB.  +N/V.  Recent endoscopy ().  Pending results. No concerns per nursing.          Objective  Vital signs:  120/72, 97.1, 80, 20, 98%    Physical Exam  Constitutional:       Appearance: Normal appearance.   HENT:      Head: Normocephalic.      Mouth/Throat:      Mouth: Mucous membranes are moist.   Eyes:      Extraocular Movements: Extraocular movements intact.   Cardiovascular:      Rate and Rhythm: Normal rate. Rhythm irregular.      Pulses: Normal pulses.      Heart sounds: Normal heart sounds.   Pulmonary:      Effort: Pulmonary effort is normal.      Breath sounds: Normal breath sounds.   Abdominal:      General: Bowel sounds are normal.      Palpations: Abdomen is soft.   Musculoskeletal:         General: Normal range of motion.      Cervical back: Normal range of motion and neck supple.      Comments: weakness   Skin:     General: Skin is warm and dry.      Capillary Refill: Capillary refill takes less than 2 seconds.   Neurological:      Mental Status: She is alert. Mental status is at baseline.   Psychiatric:         Mood and Affect: Mood normal.         Behavior: Behavior normal.         Assessment/Plan  Problem List Items Addressed This Visit       Essential hypertension      Stable   Metoprolol   monitor         GERD without esophagitis      Stable   no epigastric pain or acidic taste in mouth   Omeprazole   monitor         Chronic atrial fibrillation (Multi)       Stable   rate controlled   Metoprolol   no SOB, fatigue, dizziness, palpitations   monitor         Dementia (Multi)      Stable   baseline mentation   no outbursts or agitation per nursing   ensure safe environment   monitor         Bilious vomiting with nausea - Primary     Continue zofran  N/V throughout day  Normalizing elevated amylase and lipase  Pending  EGD results          Medications, treatments, and labs reviewed  Continue medications and treatments as listed in EMR    YOANA Huddleston      Electronically Signed By: YOANA Huddlesotn   8/15/24  7:50 PM
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
## 2024-08-23 NOTE — PROGRESS NOTES
PROGRESS NOTE    Subjective   Chief complaint: Lizette Oneill is a 83 y.o. female who is a long term care patient being seen and evaluated for N/V    HPI:  Patient seen and evaluated for continues N/V. Endorses abdominal discomfort. No F/C constipation/diarrhea. Evaluated by [facility]- CT ordered. Pending endoscopy. Recent labs demonstrate Na/Cl 134/94 with nl hepatic profile and nl CBC. Tolerating phenergan well. Patient offers no complaints of dizziness.  No other concerns per nursing.          Objective   Vital signs: 120/72-97.1-80-20-98%    Physical Exam  Constitutional:       Appearance: Normal appearance.   HENT:      Head: Normocephalic.      Mouth/Throat:      Mouth: Mucous membranes are moist.   Eyes:      Extraocular Movements: Extraocular movements intact.   Cardiovascular:      Rate and Rhythm: Normal rate. Rhythm irregular.      Pulses: Normal pulses.      Heart sounds: Normal heart sounds.   Pulmonary:      Effort: Pulmonary effort is normal.      Breath sounds: Normal breath sounds.   Abdominal:      General: Bowel sounds are normal.      Palpations: Abdomen is soft.   Musculoskeletal:         General: Normal range of motion.      Cervical back: Normal range of motion and neck supple.      Comments: weakness   Skin:     General: Skin is warm and dry.      Capillary Refill: Capillary refill takes less than 2 seconds.   Neurological:      Mental Status: She is alert. Mental status is at baseline.   Psychiatric:         Mood and Affect: Mood normal.         Behavior: Behavior normal.         Assessment/Plan   Problem List Items Addressed This Visit       Essential hypertension      Stable   Metoprolol   monitor         Chronic atrial fibrillation (Multi) - Primary      Stable   rate controlled   Metoprolol   no SOB, fatigue, dizziness, palpitations   monitor         Dementia (Multi)      Stable   baseline mentation   no outbursts or agitation per nursing   ensure safe environment   monitor         Generalized  abdominal pain     Accompanied by N/V  recent labs  NL hepatic profile and CBC  Na/Cl 134/94  Pending endoscopy and CT         Bilious vomiting with nausea     Continue phenergan  N/V throughout day  Normalizing Rakel/Lip, nl Hepatic profile, nl CBC, Na/Cl 134/94  Pending CT and endoscopy          Medications, treatments, and labs reviewed  Continue medications and treatments as listed in EMR    Jessi Felton, APRN-CNP

## 2024-08-23 NOTE — ASSESSMENT & PLAN NOTE
No injuries reported  Continue calling for assistance when needed  Maintain proper footwear, clear pathways and adequate lighting

## 2024-08-23 NOTE — ASSESSMENT & PLAN NOTE
Continue phenergan  N/V throughout day  Normalizing Rakel/Lip, nl Hepatic profile, nl CBC, Na/Cl 134/94  Pending CT and endoscopy

## 2024-08-23 NOTE — ASSESSMENT & PLAN NOTE
Accompanied by N/V  recent labs  NL hepatic profile and CBC  Na/Cl 134/94  Pending endoscopy and CT

## 2024-08-23 NOTE — PROGRESS NOTES
PROGRESS NOTE    Subjective   Chief complaint: Lizette Oneill is a 83 y.o. female who is a long term care patient being seen and evaluated for fall    HPI:  Patient seen and evaluated s/p fall. No injuries reported. Patient states self transferring and falling. Denies striking head. Full ROM in all extremities. No further concerns per nursing.           Objective   Vital signs: 120/72-97.1-80-20-98%    Physical Exam  Constitutional:       Appearance: Normal appearance.   HENT:      Head: Normocephalic.      Mouth/Throat:      Mouth: Mucous membranes are moist.   Eyes:      Extraocular Movements: Extraocular movements intact.   Cardiovascular:      Rate and Rhythm: Normal rate. Rhythm irregular.      Pulses: Normal pulses.      Heart sounds: Normal heart sounds.   Pulmonary:      Effort: Pulmonary effort is normal.      Breath sounds: Normal breath sounds.   Abdominal:      General: Bowel sounds are normal.      Palpations: Abdomen is soft.   Musculoskeletal:         General: Normal range of motion.      Cervical back: Normal range of motion and neck supple.      Comments: weakness   Skin:     General: Skin is warm and dry.      Capillary Refill: Capillary refill takes less than 2 seconds.   Neurological:      Mental Status: She is alert. Mental status is at baseline.   Psychiatric:         Mood and Affect: Mood normal.         Behavior: Behavior normal.         Assessment/Plan   Problem List Items Addressed This Visit       Essential hypertension      Stable   Metoprolol   monitor         Dementia (Multi)      Stable   baseline mentation   no outbursts or agitation per nursing   ensure safe environment   monitor         Bilious vomiting with nausea     Continue phenergan  N/V throughout day  Normalizing Rakel/Lip, nl Hepatic profile, nl CBC, Na/Cl 134/94  Pending CT and endoscopy         Fall - Primary     No injuries reported  Continue calling for assistance when needed  Maintain proper footwear, clear pathways and  adequate lighting            Medications, treatments, and labs reviewed  Continue medications and treatments as listed in EMR    Jessi Felton, APRN-CNP

## 2024-08-28 ENCOUNTER — NURSING HOME VISIT (OUTPATIENT)
Dept: POST ACUTE CARE | Facility: EXTERNAL LOCATION | Age: 84
End: 2024-08-28
Payer: MEDICARE

## 2024-08-28 DIAGNOSIS — F01.54 VASCULAR DEMENTIA WITH ANXIETY, UNSPECIFIED DEMENTIA SEVERITY (MULTI): ICD-10-CM

## 2024-08-28 DIAGNOSIS — K21.9 GERD WITHOUT ESOPHAGITIS: ICD-10-CM

## 2024-08-28 DIAGNOSIS — K58.9 IRRITABLE BOWEL SYNDROME WITHOUT DIARRHEA: ICD-10-CM

## 2024-08-28 DIAGNOSIS — I48.20 CHRONIC ATRIAL FIBRILLATION (MULTI): Primary | ICD-10-CM

## 2024-08-28 PROCEDURE — 99309 SBSQ NF CARE MODERATE MDM 30: CPT

## 2024-08-28 NOTE — LETTER
Patient: Lizette Oneill  : 1940    Encounter Date: 2024    PROGRESS NOTE    Subjective  Chief complaint: Lizette Oneill is a 83 y.o. female who is a long term care patient being seen and evaluated for CT results    HPI:  Patient seen and evaluated for CT results.  CT abdomen negative for ileus, obstruction.  Recently started on omeprazole.  Continues on promethazine for nausea.  Patient to have EGD in 8 weeks.  Patient offers no complaints of F/C/N/V/D, abdominal pain.  No concerns per nursing          Objective  Vital signs: .  120/72, 97.1, 80, 20, 98%    Physical Exam  Constitutional:       Appearance: Normal appearance.   HENT:      Head: Normocephalic.      Mouth/Throat:      Mouth: Mucous membranes are moist.   Eyes:      Extraocular Movements: Extraocular movements intact.   Cardiovascular:      Rate and Rhythm: Normal rate. Rhythm irregular.      Pulses: Normal pulses.      Heart sounds: Normal heart sounds.   Pulmonary:      Effort: Pulmonary effort is normal.      Breath sounds: Normal breath sounds.   Abdominal:      General: Bowel sounds are normal.      Palpations: Abdomen is soft.   Musculoskeletal:         General: Normal range of motion.      Cervical back: Normal range of motion and neck supple.      Comments: weakness   Skin:     General: Skin is warm and dry.      Capillary Refill: Capillary refill takes less than 2 seconds.   Neurological:      Mental Status: She is alert. Mental status is at baseline.   Psychiatric:         Mood and Affect: Mood normal.         Behavior: Behavior normal.         Assessment/Plan  Problem List Items Addressed This Visit       GERD without esophagitis      Stable   no epigastric pain or acidic taste in mouth   Omeprazole   monitor         IBS (irritable bowel syndrome)      Stable   Intermittent N/V occasional pain   negative CT   EGD in 8 weeks   monitor         Chronic atrial fibrillation (Multi) - Primary      Stable   rate controlled   Metoprolol   no  SOB, fatigue, dizziness, palpitations   monitor         Dementia (Multi)      Stable   baseline mentation   no outbursts or agitation per nursing   ensure safe environment   monitor          Medications, treatments, and labs reviewed  Continue medications and treatments as listed in EMR    YOANA Huddleston      Electronically Signed By: YOANA Huddleston   8/31/24  2:04 PM

## 2024-08-31 NOTE — PROGRESS NOTES
PROGRESS NOTE    Subjective   Chief complaint: Lizette Oneill is a 83 y.o. female who is a long term care patient being seen and evaluated for CT results    HPI:  Patient seen and evaluated for CT results.  CT abdomen negative for ileus, obstruction.  Recently started on omeprazole.  Continues on promethazine for nausea.  Patient to have EGD in 8 weeks.  Patient offers no complaints of F/C/N/V/D, abdominal pain.  No concerns per nursing          Objective   Vital signs: .  120/72, 97.1, 80, 20, 98%    Physical Exam  Constitutional:       Appearance: Normal appearance.   HENT:      Head: Normocephalic.      Mouth/Throat:      Mouth: Mucous membranes are moist.   Eyes:      Extraocular Movements: Extraocular movements intact.   Cardiovascular:      Rate and Rhythm: Normal rate. Rhythm irregular.      Pulses: Normal pulses.      Heart sounds: Normal heart sounds.   Pulmonary:      Effort: Pulmonary effort is normal.      Breath sounds: Normal breath sounds.   Abdominal:      General: Bowel sounds are normal.      Palpations: Abdomen is soft.   Musculoskeletal:         General: Normal range of motion.      Cervical back: Normal range of motion and neck supple.      Comments: weakness   Skin:     General: Skin is warm and dry.      Capillary Refill: Capillary refill takes less than 2 seconds.   Neurological:      Mental Status: She is alert. Mental status is at baseline.   Psychiatric:         Mood and Affect: Mood normal.         Behavior: Behavior normal.         Assessment/Plan   Problem List Items Addressed This Visit       GERD without esophagitis      Stable   no epigastric pain or acidic taste in mouth   Omeprazole   monitor         IBS (irritable bowel syndrome)      Stable   Intermittent N/V occasional pain   negative CT   EGD in 8 weeks   monitor         Chronic atrial fibrillation (Multi) - Primary      Stable   rate controlled   Metoprolol   no SOB, fatigue, dizziness, palpitations   monitor         Dementia  (Multi)      Stable   baseline mentation   no outbursts or agitation per nursing   ensure safe environment   monitor          Medications, treatments, and labs reviewed  Continue medications and treatments as listed in EMR    Jessi Felton, APRN-CNP

## 2024-09-05 ENCOUNTER — NURSING HOME VISIT (OUTPATIENT)
Dept: POST ACUTE CARE | Facility: EXTERNAL LOCATION | Age: 84
End: 2024-09-05
Payer: MEDICARE

## 2024-09-05 DIAGNOSIS — F01.50 VASCULAR DEMENTIA WITHOUT BEHAVIORAL DISTURBANCE, PSYCHOTIC DISTURBANCE, MOOD DISTURBANCE, OR ANXIETY, UNSPECIFIED DEMENTIA SEVERITY (MULTI): ICD-10-CM

## 2024-09-05 DIAGNOSIS — I48.20 CHRONIC ATRIAL FIBRILLATION (MULTI): Primary | ICD-10-CM

## 2024-09-05 DIAGNOSIS — I10 ESSENTIAL HYPERTENSION: ICD-10-CM

## 2024-09-05 DIAGNOSIS — K21.9 GERD WITHOUT ESOPHAGITIS: ICD-10-CM

## 2024-09-05 PROCEDURE — 99309 SBSQ NF CARE MODERATE MDM 30: CPT

## 2024-09-05 NOTE — LETTER
Patient: Lizette Oneill  : 1940    Encounter Date: 2024    PROGRESS NOTE    Subjective  Chief complaint: Lizette Oneill is a 83 y.o. female who is a long term care patient being seen and evaluated for general medical care and monthly follow up    HPI:  Patient seen and evaluated for general medical care and monthly follow-up.  Patient at baseline mentation, cooperative and pleasant. No outbursts noted per nursing. BP at goal.  A-fib stable - no dizziness, palpitations or SOB.  Patient offers no complaints of F/C/V, cough, SOB.  No concerns per nursing.          Objective  Vital signs: 120/72, 97.1, 80, 20, 98%    Physical Exam  Constitutional:       Appearance: Normal appearance.   HENT:      Head: Normocephalic.      Mouth/Throat:      Mouth: Mucous membranes are moist.   Eyes:      Extraocular Movements: Extraocular movements intact.   Cardiovascular:      Rate and Rhythm: Normal rate. Rhythm irregular.      Pulses: Normal pulses.      Heart sounds: Normal heart sounds.   Pulmonary:      Effort: Pulmonary effort is normal.      Breath sounds: Normal breath sounds.   Abdominal:      General: Bowel sounds are normal.      Palpations: Abdomen is soft.   Musculoskeletal:         General: Normal range of motion.      Cervical back: Normal range of motion and neck supple.      Comments: weakness   Skin:     General: Skin is warm and dry.      Capillary Refill: Capillary refill takes less than 2 seconds.   Neurological:      Mental Status: She is alert. Mental status is at baseline.   Psychiatric:         Mood and Affect: Mood normal.         Behavior: Behavior normal.         Assessment/Plan  Problem List Items Addressed This Visit       Essential hypertension      Stable   Metoprolol   monitor         GERD without esophagitis      Stable   no epigastric pain or acidic taste in mouth   Omeprazole   monitor         Chronic atrial fibrillation (Multi) - Primary      Stable   rate controlled   Metoprolol   no SOB,  fatigue, dizziness, palpitations   monitor         Dementia (Multi)      Stable   baseline mentation   no outbursts or agitation per nursing   ensure safe environment   monitor          Medications, treatments, and labs reviewed  Continue medications and treatments as listed in EMR    YOANA Huddleston      Electronically Signed By: YOANA Huddleston   9/8/24 11:48 AM

## 2024-09-08 NOTE — PROGRESS NOTES
PROGRESS NOTE    Subjective   Chief complaint: Lizette Oneill is a 83 y.o. female who is a long term care patient being seen and evaluated for general medical care and monthly follow up    HPI:  Patient seen and evaluated for general medical care and monthly follow-up.  Patient at baseline mentation, cooperative and pleasant. No outbursts noted per nursing. BP at goal.  A-fib stable - no dizziness, palpitations or SOB.  Patient offers no complaints of F/C/V, cough, SOB.  No concerns per nursing.          Objective   Vital signs: 120/72, 97.1, 80, 20, 98%    Physical Exam  Constitutional:       Appearance: Normal appearance.   HENT:      Head: Normocephalic.      Mouth/Throat:      Mouth: Mucous membranes are moist.   Eyes:      Extraocular Movements: Extraocular movements intact.   Cardiovascular:      Rate and Rhythm: Normal rate. Rhythm irregular.      Pulses: Normal pulses.      Heart sounds: Normal heart sounds.   Pulmonary:      Effort: Pulmonary effort is normal.      Breath sounds: Normal breath sounds.   Abdominal:      General: Bowel sounds are normal.      Palpations: Abdomen is soft.   Musculoskeletal:         General: Normal range of motion.      Cervical back: Normal range of motion and neck supple.      Comments: weakness   Skin:     General: Skin is warm and dry.      Capillary Refill: Capillary refill takes less than 2 seconds.   Neurological:      Mental Status: She is alert. Mental status is at baseline.   Psychiatric:         Mood and Affect: Mood normal.         Behavior: Behavior normal.         Assessment/Plan   Problem List Items Addressed This Visit       Essential hypertension      Stable   Metoprolol   monitor         GERD without esophagitis      Stable   no epigastric pain or acidic taste in mouth   Omeprazole   monitor         Chronic atrial fibrillation (Multi) - Primary      Stable   rate controlled   Metoprolol   no SOB, fatigue, dizziness, palpitations   monitor         Dementia  (Multi)      Stable   baseline mentation   no outbursts or agitation per nursing   ensure safe environment   monitor          Medications, treatments, and labs reviewed  Continue medications and treatments as listed in EMR    Jessi Felton, APRN-CNP

## 2024-10-18 ENCOUNTER — NURSING HOME VISIT (OUTPATIENT)
Dept: POST ACUTE CARE | Facility: EXTERNAL LOCATION | Age: 84
End: 2024-10-18
Payer: MEDICARE

## 2024-10-18 DIAGNOSIS — I10 ESSENTIAL HYPERTENSION: ICD-10-CM

## 2024-10-18 DIAGNOSIS — I48.20 CHRONIC ATRIAL FIBRILLATION (MULTI): Primary | ICD-10-CM

## 2024-10-18 DIAGNOSIS — F01.50 VASCULAR DEMENTIA WITHOUT BEHAVIORAL DISTURBANCE, PSYCHOTIC DISTURBANCE, MOOD DISTURBANCE, OR ANXIETY, UNSPECIFIED DEMENTIA SEVERITY (MULTI): ICD-10-CM

## 2024-10-18 DIAGNOSIS — K21.9 GERD WITHOUT ESOPHAGITIS: ICD-10-CM

## 2024-10-18 PROCEDURE — 99309 SBSQ NF CARE MODERATE MDM 30: CPT

## 2024-10-18 NOTE — LETTER
Patient: Lizette Oneill  : 1940    Encounter Date: 10/18/2024    PROGRESS NOTE    Subjective  Chief complaint: Lizette Oneill is a 84 y.o. female who is a long term care patient being seen and evaluated for general medical care and monthly follow-up    HPI:  Patient seen and evaluated for general medical care monthly follow-up.  Patient at baseline mentation, confused, cooperative.  Adjusting to new environment[place] due to elopement risk. No outbursts or agitation reported.  Recent UTI resolved - no constitutional symptoms/ABD/flank pain/ symptoms.  Appetite good.  Sleeping well.  No concerns per nursing          Objective  Vital signs: .  142/84, 98.2, 79, 18, 98%    Physical Exam  Constitutional:       Appearance: Normal appearance.   HENT:      Head: Normocephalic.      Mouth/Throat:      Mouth: Mucous membranes are moist.   Eyes:      Extraocular Movements: Extraocular movements intact.   Cardiovascular:      Rate and Rhythm: Normal rate. Rhythm irregular.      Pulses: Normal pulses.      Heart sounds: Normal heart sounds.   Pulmonary:      Effort: Pulmonary effort is normal.      Breath sounds: Normal breath sounds.   Abdominal:      General: Bowel sounds are normal.      Palpations: Abdomen is soft.   Musculoskeletal:         General: Normal range of motion.      Cervical back: Normal range of motion and neck supple.      Comments: weakness   Skin:     General: Skin is warm and dry.      Capillary Refill: Capillary refill takes less than 2 seconds.   Neurological:      Mental Status: She is alert. Mental status is at baseline.   Psychiatric:         Mood and Affect: Mood normal.         Behavior: Behavior normal.         Assessment/Plan  Problem List Items Addressed This Visit       Essential hypertension      Stable   Metoprolol   routine BMP   monitor BP         GERD without esophagitis     no complaints  Continue prilosec  monitor           Chronic atrial fibrillation (Multi) - Primary       Stable   rate controlled   Metoprolol   no SOB, fatigue, dizziness, palpitations   monitor         Dementia      Stable   baseline mentation   no outbursts or agitation per nursing   ensure safe environment- safety maintained in [facility]   Aricept   monitor          Medications, treatments, and labs reviewed  Continue medications and treatments as listed in EMR    YOANA Huddleston      Electronically Signed By: YOANA Huddleston   10/20/24  9:55 AM

## 2024-10-20 NOTE — PROGRESS NOTES
PROGRESS NOTE    Subjective   Chief complaint: Lizette Oneill is a 84 y.o. female who is a long term care patient being seen and evaluated for general medical care and monthly follow-up    HPI:  Patient seen and evaluated for general medical care monthly follow-up.  Patient at baseline mentation, confused, cooperative.  Adjusting to new environment-[place] due to elopement risk. No outbursts or agitation reported.  Recent UTI resolved - no constitutional symptoms/ABD/flank pain/ symptoms.  Appetite good.  Sleeping well.  No concerns per nursing          Objective   Vital signs: .  142/84, 98.2, 79, 18, 98%    Physical Exam  Constitutional:       Appearance: Normal appearance.   HENT:      Head: Normocephalic.      Mouth/Throat:      Mouth: Mucous membranes are moist.   Eyes:      Extraocular Movements: Extraocular movements intact.   Cardiovascular:      Rate and Rhythm: Normal rate. Rhythm irregular.      Pulses: Normal pulses.      Heart sounds: Normal heart sounds.   Pulmonary:      Effort: Pulmonary effort is normal.      Breath sounds: Normal breath sounds.   Abdominal:      General: Bowel sounds are normal.      Palpations: Abdomen is soft.   Musculoskeletal:         General: Normal range of motion.      Cervical back: Normal range of motion and neck supple.      Comments: weakness   Skin:     General: Skin is warm and dry.      Capillary Refill: Capillary refill takes less than 2 seconds.   Neurological:      Mental Status: She is alert. Mental status is at baseline.   Psychiatric:         Mood and Affect: Mood normal.         Behavior: Behavior normal.         Assessment/Plan   Problem List Items Addressed This Visit       Essential hypertension      Stable   Metoprolol   routine BMP   monitor BP         GERD without esophagitis     no complaints  Continue prilosec  monitor           Chronic atrial fibrillation (Multi) - Primary      Stable   rate controlled   Metoprolol   no SOB, fatigue, dizziness,  palpitations   monitor         Dementia      Stable   baseline mentation   no outbursts or agitation per nursing   ensure safe environment- safety maintained in [facility]   Aricept   monitor          Medications, treatments, and labs reviewed  Continue medications and treatments as listed in EMR    Jessi Felton, APRN-CNP

## 2024-10-20 NOTE — ASSESSMENT & PLAN NOTE
Stable   baseline mentation   no outbursts or agitation per nursing   ensure safe environment- safety maintained in [facility]   Aricept   monitor

## 2024-11-29 ENCOUNTER — NURSING HOME VISIT (OUTPATIENT)
Dept: POST ACUTE CARE | Facility: EXTERNAL LOCATION | Age: 84
End: 2024-11-29
Payer: MEDICARE

## 2024-11-29 DIAGNOSIS — K21.9 GERD WITHOUT ESOPHAGITIS: ICD-10-CM

## 2024-11-29 DIAGNOSIS — I10 ESSENTIAL HYPERTENSION: ICD-10-CM

## 2024-11-29 DIAGNOSIS — F01.50 VASCULAR DEMENTIA WITHOUT BEHAVIORAL DISTURBANCE, PSYCHOTIC DISTURBANCE, MOOD DISTURBANCE, OR ANXIETY, UNSPECIFIED DEMENTIA SEVERITY (MULTI): ICD-10-CM

## 2024-11-29 DIAGNOSIS — I48.20 CHRONIC ATRIAL FIBRILLATION (MULTI): Primary | ICD-10-CM

## 2024-11-29 NOTE — LETTER
Patient: Lizette Oneill  : 1940    Encounter Date: 2024    PROGRESS NOTE    Subjective  Chief complaint: Lizette Oneill is a 84 y.o. female who is a long term care patient being seen and evaluated for general medical care and monthly follow-up      HPI:  Patient seen and evaluated for general medical care and monthly follow-up.  Patient at baseline mentation, cooperative and pleasant. No outbursts noted per nursing. BP at goal.  A-fib stable - no dizziness, palpitations or SOB.  Patient offers no complaints of F/C/V, cough, SOB.  No concerns per nursing.          Objective  Vital signs: .  103/56, 97.6, 74, 17, 97%    Physical Exam  Constitutional:       Appearance: Normal appearance.   HENT:      Head: Normocephalic.      Mouth/Throat:      Mouth: Mucous membranes are moist.   Eyes:      Extraocular Movements: Extraocular movements intact.   Cardiovascular:      Rate and Rhythm: Normal rate. Rhythm irregular.      Pulses: Normal pulses.      Heart sounds: Normal heart sounds.   Pulmonary:      Effort: Pulmonary effort is normal.      Breath sounds: Normal breath sounds.   Abdominal:      General: Bowel sounds are normal.      Palpations: Abdomen is soft.   Musculoskeletal:         General: Normal range of motion.      Cervical back: Normal range of motion and neck supple.      Comments: weakness   Skin:     General: Skin is warm and dry.      Capillary Refill: Capillary refill takes less than 2 seconds.   Neurological:      Mental Status: She is alert. Mental status is at baseline.   Psychiatric:         Mood and Affect: Mood normal.         Behavior: Behavior normal.         Assessment/Plan  Problem List Items Addressed This Visit       Essential hypertension      Stable   Metoprolol   routine BMP   monitor BP         GERD without esophagitis     no complaints  Continue prilosec  monitor           Chronic atrial fibrillation (Multi) - Primary      Stable   rate controlled   Metoprolol   no SOB, fatigue,  dizziness, palpitations   monitor         Dementia      Stable   baseline mentation   no outbursts or agitation per nursing   ensure safe environment- safety maintained in [facility]   Aricept   monitor          Medications, treatments, and labs reviewed  Continue medications and treatments as listed in EMR    YOANA Huddleston      Electronically Signed By: YOANA Huddleston   11/30/24 10:54 PM

## 2024-12-01 NOTE — PROGRESS NOTES
PROGRESS NOTE    Subjective   Chief complaint: Lizette Oneill is a 84 y.o. female who is a long term care patient being seen and evaluated for general medical care and monthly follow-up      HPI:  Patient seen and evaluated for general medical care and monthly follow-up.  Patient at baseline mentation, cooperative and pleasant. No outbursts noted per nursing. BP at goal.  A-fib stable - no dizziness, palpitations or SOB.  Patient offers no complaints of F/C/V, cough, SOB.  No concerns per nursing.          Objective   Vital signs: .  103/56, 97.6, 74, 17, 97%    Physical Exam  Constitutional:       Appearance: Normal appearance.   HENT:      Head: Normocephalic.      Mouth/Throat:      Mouth: Mucous membranes are moist.   Eyes:      Extraocular Movements: Extraocular movements intact.   Cardiovascular:      Rate and Rhythm: Normal rate. Rhythm irregular.      Pulses: Normal pulses.      Heart sounds: Normal heart sounds.   Pulmonary:      Effort: Pulmonary effort is normal.      Breath sounds: Normal breath sounds.   Abdominal:      General: Bowel sounds are normal.      Palpations: Abdomen is soft.   Musculoskeletal:         General: Normal range of motion.      Cervical back: Normal range of motion and neck supple.      Comments: weakness   Skin:     General: Skin is warm and dry.      Capillary Refill: Capillary refill takes less than 2 seconds.   Neurological:      Mental Status: She is alert. Mental status is at baseline.   Psychiatric:         Mood and Affect: Mood normal.         Behavior: Behavior normal.         Assessment/Plan   Problem List Items Addressed This Visit       Essential hypertension      Stable   Metoprolol   routine BMP   monitor BP         GERD without esophagitis     no complaints  Continue prilosec  monitor           Chronic atrial fibrillation (Multi) - Primary      Stable   rate controlled   Metoprolol   no SOB, fatigue, dizziness, palpitations   monitor         Dementia      Stable    baseline mentation   no outbursts or agitation per nursing   ensure safe environment- safety maintained in [facility]   Aricept   monitor          Medications, treatments, and labs reviewed  Continue medications and treatments as listed in EMR    Jessi Felton, APRN-CNP

## 2025-05-06 ENCOUNTER — LAB REQUISITION (OUTPATIENT)
Dept: LAB | Facility: HOSPITAL | Age: 85
End: 2025-05-06
Payer: MEDICARE

## 2025-05-06 DIAGNOSIS — R41.82 ALTERED MENTAL STATUS, UNSPECIFIED: ICD-10-CM

## 2025-05-06 LAB — AMMONIA PLAS-SCNC: 29 UMOL/L (ref 16–53)

## 2025-05-06 PROCEDURE — 82140 ASSAY OF AMMONIA: CPT | Mod: OUT | Performed by: INTERNAL MEDICINE
